# Patient Record
Sex: MALE
[De-identification: names, ages, dates, MRNs, and addresses within clinical notes are randomized per-mention and may not be internally consistent; named-entity substitution may affect disease eponyms.]

---

## 2017-11-25 ENCOUNTER — CHARTING TRANS (OUTPATIENT)
Dept: OTHER | Age: 67
End: 2017-11-25

## 2021-02-22 ENCOUNTER — LAB ENCOUNTER (OUTPATIENT)
Dept: LAB | Age: 71
End: 2021-02-22
Attending: OPHTHALMOLOGY
Payer: MEDICARE

## 2021-02-22 DIAGNOSIS — Z01.818 PRE-PROCEDURAL EXAMINATION: ICD-10-CM

## 2021-02-23 LAB — SARS-COV-2 RNA RESP QL NAA+PROBE: NOT DETECTED

## 2021-04-19 ENCOUNTER — HOSPITAL ENCOUNTER (OUTPATIENT)
Age: 71
Discharge: ANOTHER HOSPITAL | DRG: 190 | End: 2021-04-19
Attending: EMERGENCY MEDICINE
Payer: MEDICARE

## 2021-04-19 ENCOUNTER — HOSPITAL ENCOUNTER (INPATIENT)
Facility: HOSPITAL | Age: 71
LOS: 4 days | Discharge: HOME | DRG: 190 | End: 2021-04-23
Attending: EMERGENCY MEDICINE | Admitting: HOSPITALIST
Payer: MEDICARE

## 2021-04-19 ENCOUNTER — APPOINTMENT (OUTPATIENT)
Dept: CT IMAGING | Facility: HOSPITAL | Age: 71
DRG: 190 | End: 2021-04-19
Attending: EMERGENCY MEDICINE
Payer: MEDICARE

## 2021-04-19 ENCOUNTER — APPOINTMENT (OUTPATIENT)
Dept: GENERAL RADIOLOGY | Facility: HOSPITAL | Age: 71
DRG: 190 | End: 2021-04-19
Attending: EMERGENCY MEDICINE
Payer: MEDICARE

## 2021-04-19 VITALS
SYSTOLIC BLOOD PRESSURE: 128 MMHG | OXYGEN SATURATION: 90 % | RESPIRATION RATE: 22 BRPM | HEART RATE: 88 BPM | DIASTOLIC BLOOD PRESSURE: 90 MMHG | TEMPERATURE: 99 F | WEIGHT: 207 LBS | HEIGHT: 70 IN | BODY MASS INDEX: 29.63 KG/M2

## 2021-04-19 DIAGNOSIS — J44.9 CHRONIC OBSTRUCTIVE PULMONARY DISEASE, UNSPECIFIED COPD TYPE (HCC): ICD-10-CM

## 2021-04-19 DIAGNOSIS — R06.02 SHORTNESS OF BREATH: Primary | ICD-10-CM

## 2021-04-19 DIAGNOSIS — J44.1 COPD EXACERBATION (HCC): ICD-10-CM

## 2021-04-19 DIAGNOSIS — R09.02 HYPOXIA: ICD-10-CM

## 2021-04-19 DIAGNOSIS — R09.02 HYPOXIA: Primary | ICD-10-CM

## 2021-04-19 PROBLEM — E87.2 METABOLIC ACIDOSIS: Status: ACTIVE | Noted: 2021-04-19

## 2021-04-19 PROBLEM — E87.1 HYPONATREMIA: Status: ACTIVE | Noted: 2021-04-19

## 2021-04-19 PROCEDURE — 71045 X-RAY EXAM CHEST 1 VIEW: CPT | Performed by: EMERGENCY MEDICINE

## 2021-04-19 PROCEDURE — 36415 COLL VENOUS BLD VENIPUNCTURE: CPT

## 2021-04-19 PROCEDURE — 80047 BASIC METABLC PNL IONIZED CA: CPT

## 2021-04-19 PROCEDURE — 99204 OFFICE O/P NEW MOD 45 MIN: CPT

## 2021-04-19 PROCEDURE — 99223 1ST HOSP IP/OBS HIGH 75: CPT | Performed by: HOSPITALIST

## 2021-04-19 PROCEDURE — 93010 ELECTROCARDIOGRAM REPORT: CPT

## 2021-04-19 PROCEDURE — 85025 COMPLETE CBC W/AUTO DIFF WBC: CPT | Performed by: EMERGENCY MEDICINE

## 2021-04-19 PROCEDURE — 93005 ELECTROCARDIOGRAM TRACING: CPT

## 2021-04-19 PROCEDURE — 84484 ASSAY OF TROPONIN QUANT: CPT

## 2021-04-19 PROCEDURE — 71275 CT ANGIOGRAPHY CHEST: CPT | Performed by: EMERGENCY MEDICINE

## 2021-04-19 RX ORDER — METHYLPREDNISOLONE SODIUM SUCCINATE 125 MG/2ML
125 INJECTION, POWDER, LYOPHILIZED, FOR SOLUTION INTRAMUSCULAR; INTRAVENOUS ONCE
Status: COMPLETED | OUTPATIENT
Start: 2021-04-19 | End: 2021-04-19

## 2021-04-19 RX ORDER — ALBUTEROL SULFATE 2.5 MG/3ML
2.5 SOLUTION RESPIRATORY (INHALATION) EVERY 6 HOURS
Status: DISCONTINUED | OUTPATIENT
Start: 2021-04-20 | End: 2021-04-19

## 2021-04-19 RX ORDER — MELATONIN
2000 2 TIMES DAILY
COMMUNITY

## 2021-04-19 RX ORDER — FLUTICASONE PROPIONATE 50 MCG
2 SPRAY, SUSPENSION (ML) NASAL DAILY
COMMUNITY

## 2021-04-19 RX ORDER — ONDANSETRON 2 MG/ML
4 INJECTION INTRAMUSCULAR; INTRAVENOUS EVERY 6 HOURS PRN
Status: DISCONTINUED | OUTPATIENT
Start: 2021-04-19 | End: 2021-04-23

## 2021-04-19 RX ORDER — MONTELUKAST SODIUM 10 MG/1
10 TABLET ORAL NIGHTLY
Status: DISCONTINUED | OUTPATIENT
Start: 2021-04-19 | End: 2021-04-23

## 2021-04-19 RX ORDER — ENOXAPARIN SODIUM 100 MG/ML
40 INJECTION SUBCUTANEOUS NIGHTLY
Status: DISCONTINUED | OUTPATIENT
Start: 2021-04-19 | End: 2021-04-23

## 2021-04-19 RX ORDER — CALCIUM ACETATE 667 MG/1
667 CAPSULE ORAL DAILY
Status: DISCONTINUED | OUTPATIENT
Start: 2021-04-20 | End: 2021-04-23

## 2021-04-19 RX ORDER — ALBUTEROL SULFATE 2.5 MG/3ML
2.5 SOLUTION RESPIRATORY (INHALATION) EVERY 6 HOURS PRN
COMMUNITY

## 2021-04-19 RX ORDER — ALBUTEROL SULFATE 2.5 MG/3ML
2.5 SOLUTION RESPIRATORY (INHALATION) EVERY 6 HOURS
Status: DISCONTINUED | OUTPATIENT
Start: 2021-04-20 | End: 2021-04-20

## 2021-04-19 RX ORDER — AMLODIPINE BESYLATE 5 MG/1
5 TABLET ORAL DAILY
Status: DISCONTINUED | OUTPATIENT
Start: 2021-04-20 | End: 2021-04-23

## 2021-04-19 RX ORDER — ACETAMINOPHEN 325 MG/1
650 TABLET ORAL EVERY 6 HOURS PRN
Status: DISCONTINUED | OUTPATIENT
Start: 2021-04-19 | End: 2021-04-23

## 2021-04-19 RX ORDER — METHYLPREDNISOLONE SODIUM SUCCINATE 125 MG/2ML
60 INJECTION, POWDER, LYOPHILIZED, FOR SOLUTION INTRAMUSCULAR; INTRAVENOUS EVERY 6 HOURS
Status: DISCONTINUED | OUTPATIENT
Start: 2021-04-20 | End: 2021-04-21

## 2021-04-19 RX ORDER — FLUTICASONE PROPIONATE 50 MCG
2 SPRAY, SUSPENSION (ML) NASAL DAILY
Status: DISCONTINUED | OUTPATIENT
Start: 2021-04-20 | End: 2021-04-23

## 2021-04-19 RX ORDER — ONDANSETRON 2 MG/ML
4 INJECTION INTRAMUSCULAR; INTRAVENOUS EVERY 4 HOURS PRN
Status: DISCONTINUED | OUTPATIENT
Start: 2021-04-19 | End: 2021-04-19

## 2021-04-19 RX ORDER — ALBUTEROL SULFATE 90 UG/1
2 AEROSOL, METERED RESPIRATORY (INHALATION) 4 TIMES DAILY
Status: DISCONTINUED | OUTPATIENT
Start: 2021-04-19 | End: 2021-04-19

## 2021-04-19 RX ORDER — MONTELUKAST SODIUM 10 MG/1
10 TABLET ORAL NIGHTLY
COMMUNITY

## 2021-04-19 NOTE — ED INITIAL ASSESSMENT (HPI)
Pt presents to the IC with complaints of feeling worsening shortness of breath over the past month. Pt last saw his doctor in March. Pt reports oxygen saturation has been running in the 70's to 80's at home, worse over past few days.     Pt awake and alert,

## 2021-04-19 NOTE — ED QUICK NOTES
[facility] ambulance here. Pt noted to have sat down to 88% on 4l with exerting himself trying to get wallet out of pocket. Oxygen increased to 6l/nc and sat 94%, RR 26.

## 2021-04-19 NOTE — ED PROVIDER NOTES
Patient Seen in: [facility]      History   Patient presents with:  Difficulty Breathing    Stated Complaint: sob x 1 day     HPI/Subjective:   HPI    Patient is a 66-year-old male with a history of hypertension COPD who presents to the im Exam     ED Triage Vitals   BP 04/19/21 1712 119/77   Pulse 04/19/21 1658 100   Resp 04/19/21 1712 24   Temp 04/19/21 1658 100.2 °F (37.9 °C)   Temp src 04/19/21 1658 Temporal   SpO2 04/19/21 1712 91 %   O2 Device 04/19/21 1710 Nasal cannula       Current: available for comparison. MDM      Patient is a pleasant 41-year-old male with a history of hypertension and COPD, not chronically on oxygen who presents with shortness of breath.   He and his wife states it has been about a month that he has

## 2021-04-19 NOTE — ED QUICK NOTES
Pt to go to ER. Per MD, ok to send pt by private ambulance. Pt reports he is feeling better; pt states he does not feel like his breathing is labored at this time; sat 90% on 4L/nc.

## 2021-04-19 NOTE — ED QUICK NOTES
MD at bedside. Pt awake and alert, skin w/d,resps reg/labored. Pt with sat 90% on 6l/nc. Pt states his oxygen level is normally in the mid 80's but sat has been in the 70's over the last couple of days.  Pt reports he does have COPD but has never bee

## 2021-04-19 NOTE — ED QUICK NOTES
Pt on stretcher, ready for transport to [facility]; pt with sat 94% on 6l/nc, will decrease to 4l/nc enroute if sat remains at 90% or higher. Pt does not appear in distress at this time, awake and alert, skin w/d.  Wife at bedside and will meet at [facility]

## 2021-04-19 NOTE — ED INITIAL ASSESSMENT (HPI)
Pt here from [address] [place] [address] for SOB. Pt was reading 68% on RA at .  Pt was placed on 6L NC

## 2021-04-19 NOTE — ED PROVIDER NOTES
Patient Seen in: [facility] Emergency Department      History   Patient presents with:  Difficulty Breathing    Stated Complaint: SOB    HPI/Subjective:   HPI    Patient is a pleasant 70-year-old male, presenting for evaluation of dyspnea and low ox Exam     ED Triage Vitals [04/19/21 1822]   BP (!) 169/92   Pulse 84   Resp 20   Temp 99.1 °F (37.3 °C)   Temp src Temporal   SpO2 94 %   O2 Device Nasal cannula       Current:/84   Pulse 84   Temp 99.1 °F (37.3 °C) (Temporal)   Resp 24   Ht 180.3 cm Resulted by: [id number]: K,    CBC WITH DIFFERENTIAL WITH PLATELET    Narrative: The following orders were created for panel order CBC WITH DIFFERENTIAL WITH PLATELET.   Procedure                               Abnormality         Status lymph node. Multiple smaller scattered mediastinal lymph nodes have short axis measurements of less than 10 mm. SCOTTY:  1.6 x 1.6 cm right hilar lymph node and 1.6 x 1.4 cm left hilar lymph node. CARDIAC:  Extensive coronary artery calcium.  PLEURA:  No mas were obtained. Results of the patient's laboratory and radiology studies were reviewed and discussed with the patient. Solu-Medrol and albuterol MDI was ordered. Patient will be admitted to the service of the [facility].   Dr. Saravanan Garcia notifi

## 2021-04-19 NOTE — ED QUICK NOTES
Pt states he is feeling much better. resps appear only slightly labored at this time. Pt did have dyspnea with exertion of changing into gown with sat down to 85%, recovered with rest up to 90% on 4l/nc.

## 2021-04-20 ENCOUNTER — APPOINTMENT (OUTPATIENT)
Dept: CV DIAGNOSTICS | Facility: HOSPITAL | Age: 71
DRG: 190 | End: 2021-04-20
Attending: INTERNAL MEDICINE
Payer: MEDICARE

## 2021-04-20 PROBLEM — J44.1 COPD EXACERBATION (HCC): Status: ACTIVE | Noted: 2021-04-19

## 2021-04-20 PROCEDURE — 99232 SBSQ HOSP IP/OBS MODERATE 35: CPT | Performed by: HOSPITALIST

## 2021-04-20 PROCEDURE — 93306 TTE W/DOPPLER COMPLETE: CPT | Performed by: INTERNAL MEDICINE

## 2021-04-20 RX ORDER — IPRATROPIUM BROMIDE AND ALBUTEROL SULFATE 2.5; .5 MG/3ML; MG/3ML
3 SOLUTION RESPIRATORY (INHALATION)
Status: DISCONTINUED | OUTPATIENT
Start: 2021-04-20 | End: 2021-04-22

## 2021-04-20 NOTE — PLAN OF CARE
Pt received at 0730 resting in bed. A&Ox4. On 5L O2 nasal cannula, saturating 92%, will wean as able. Does desaturate to low 80's with activity. Dyspnea on exertion. Nonproductive cough. Lungs with coarse crackles.  IV steroids and nebulizer treatments as o

## 2021-04-20 NOTE — H&P
[facility]  History and Physical     Gera August Patient Status:  Inpatient    1950 MRN [id number]   [facility] 2NE-A Attending Daniel Fine, [address] [facility] Day # 0 PCP Aman Reyes DO     Chief Complaint: SOB    History o mouth nightly., Disp: , Rfl:   guaiFENesin (MUCINEX OR), Take 1,200 mg by mouth 2 (two) times a day., Disp: , Rfl:   AmLODIPine Besylate (NORVASC) 5 MG Oral Tab, Take 5 mg by mouth daily. , Disp: , Rfl:   Multiple Vitamins-Minerals (MULTI-VITAMIN/MINERALS) CA  --  8.8   ALB  --  3.3*   NA  --  133*   K  --  4.7   CL  --  107   CO2  --  18.0*   ALKPHO  --  65   AST  --  32   ALT  --  41   BILT  --  0.5   TP  --  7.7       Estimated Creatinine Clearance: 104.6 mL/min (A) (based on SCr of 0.69 mg/dL (L)).

## 2021-04-20 NOTE — PLAN OF CARE
Received pt at 2230. A&Ox4, NSR on tele, lung sounds with coarse crackles, on 6L NC. Sats 88-91% with rest but will drop to low 80s with any exertion. Up with SBA.  Has had a chronic cough for years but recently has had yellow sputum production along with c

## 2021-04-20 NOTE — CM/SW NOTE
04/20/21 1300   CM/SW Referral Data   Referral Source Physician   Reason for Referral Discharge planning   Informant Patient   Patient Info   Patient's Mental Status Alert;Oriented   Patient's [address]   Number of Levels in Home   [facility])

## 2021-04-20 NOTE — CONSULTS
Pio Frye [address] [facility] [address] Note [facility]  Report of Consultation    Samira Jamil Patient Status:  Inpatient    1950 MRN [id number]   [facility] 2NE-A Attending Maritza Brooks used smokeless tobacco. He reports previous alcohol use. He reports that he does not use drugs.     Allergies:  No Known Allergies    Medications:  • azithromycin  500 mg Intravenous Q24H   • amLODIPine Besylate  5 mg Oral Daily   • calcium acetate  667 mg 0321 124/66 97.7 °F (36.5 °C) Oral 95 18 95 % — —   04/20/21 0116 145/67 98 °F (36.7 °C) Oral 105 22 (!) 86 % — —   04/19/21 2215 139/71 — — 97 — (!) 83 % — —   04/19/21 2200 149/77 98.7 °F (37.1 °C) Oral 89 — 91 % — 203 lb (92.1 kg)   04/19/21 1930 140/84 Recent Labs   Lab 04/19/21  1836   INR 1.03   PTT 29.8       Other Labs:   COVID-19 Lab Results    COVID-19  Lab Results   Component Value Date    COVID19 Not Detected 04/19/2021    COVID19 Not Detected 02/22/2021       Pro-Calcitonin  Lab Results   Co failure due to COPD exacerbation, need to evaluate for concomitant pulmonary HTN  · COPD exacerbation with 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

## 2021-04-20 NOTE — PROGRESS NOTES
04/20/21 1655   Mobility   O2 walk?  Yes   SPO2% on Room Air at Rest 78   SPO2% on Oxygen at Rest 93   At rest oxygen flow (liters per minute) 5   SPO2% Ambulation on Room Air 78   SPO2% Ambulation on Oxygen 90   Ambulation oxygen flow (liters per minute

## 2021-04-20 NOTE — PROGRESS NOTES
[facility]  Progress note     Carmina Davalos Patient Status:  Inpatient    1950 MRN [id number]   [facility] 2NE-A Attending Jennifer Arroyo, [address] [facility] Day # 1 PCP Franki Mario DO     Chief Complaint: SOB    Subjective: osorio last 168 hours. Inflammatory Markers  No results for input(s): CRP, LAURA, LDH, DDIMER in the last 168 hours. Imaging: Imaging data reviewed in Epic. ASSESSMENT / PLAN:     1. Acute hypoxic respiratory failure secondary to COPD exacerbation  1.  St

## 2021-04-20 NOTE — CM/SW NOTE
04/20/21 1300   CM/SW Referral Data   Referral Source Physician   Reason for Referral Discharge planning   Informant Spouse   Patient [address]   Number of Levels in Home   (Multiple)   Number of Stair in Home 4 sets of 5 s

## 2021-04-21 PROCEDURE — 99232 SBSQ HOSP IP/OBS MODERATE 35: CPT | Performed by: INTERNAL MEDICINE

## 2021-04-21 RX ORDER — METHYLPREDNISOLONE SODIUM SUCCINATE 125 MG/2ML
60 INJECTION, POWDER, LYOPHILIZED, FOR SOLUTION INTRAMUSCULAR; INTRAVENOUS EVERY 12 HOURS
Status: DISCONTINUED | OUTPATIENT
Start: 2021-04-21 | End: 2021-04-22

## 2021-04-21 RX ORDER — FUROSEMIDE 10 MG/ML
20 INJECTION INTRAMUSCULAR; INTRAVENOUS ONCE
Status: COMPLETED | OUTPATIENT
Start: 2021-04-21 | End: 2021-04-21

## 2021-04-21 RX ORDER — FUROSEMIDE 10 MG/ML
20 INJECTION INTRAMUSCULAR; INTRAVENOUS ONCE
Status: COMPLETED | OUTPATIENT
Start: 2021-04-22 | End: 2021-04-22

## 2021-04-21 NOTE — PLAN OF CARE
Assumed care of patient 1930 up in chair. AO x4. NSR on tele monitor, ST with ambulation. O2 sat adequate on 5L at rest, requires 8L with ambulation/activity. Denies chest pain and SOB, although does endorse PADILLA. Plan of care updated with patient.  Bed lock

## 2021-04-21 NOTE — PLAN OF CARE
Pt received at 0730 resting in bed. A&Ox4. On 4L O2 nasal cannula, will wean as able. Requires 8L while ambulating. Nonproductive cough. Using flutter valve as ordered. IV solumedrol and nebulizer treatments per order.  Sinus rhythm/sinus tach on the [facility]

## 2021-04-21 NOTE — PROGRESS NOTES
Formerly named [facility]    [facility] [place] [address]    Phone:  [phone]    Fax:  [phone]       Thank You for choosing us for your health care visit. We are glad to serve you and happy to provide you with this summary of your visit. Please help us to ensure we have accurate records. If you find anything that needs to be changed, please let our staff know as soon as possible.          Your Demographic Information     Patient Name Sex     Chris Contreras Male 1968       Ethnic Group Patient Race    Not of  or  Origin White      Your Visit Details     Date & Time Provider Department    3/21/2017 11:30 AM Alessandra Mcguire PA-C Formerly named [facility]      Your Upcoming Appointment*(Max 10)       1:00 PM CDT   Follow-up Visit with Alessandra Mcguire PA-C   [facility] ([facility])    [address] [place] [address] [address] [place] [address]   [phone]              Your To Do List     Follow-Up    Return in about 10 days (around 3/31/2017) for Left wrist, cast off XR (2vw C-arm), cast change.      Your Vitals Were     Smoking Status                   Former Smoker           Medications Prescribed or Re-Ordered Today     None      Your Current Medications Are        Disp Refills Start End    ibuprofen (MOTRIN) 800 MG tablet 42 tablet 1 3/14/2017     Sig - Route: Take 1 tablet by mouth 3 times daily as needed for Pain. - Oral    Class: Eprescribe    Notes to Pharmacy: Please apologize to pt. Was sent to wrong pharmacy. An    HYDROcodone-acetaminophen (NORCO) 5-325 MG per tablet 30 tablet 0 3/9/2017     Sig - Route: Take 2 tablets by mouth every 6 hours as needed for Pain. - Oral    aspirin 81 MG tablet        Sig - Route: Take 81 mg by mouth daily. - Oral    Class: Historical Med    tadalafil (CIALIS) 5 MG tablet 30 tablet 11 2013     Sig - Route:  04/21/21 1110   Mobility   O2 walk?  Yes   SPO2% on Room Air at Rest 84   SPO2% on Oxygen at Rest 96   At rest oxygen flow (liters per minute) 4   SPO2% Ambulation on Room Air 70   SPO2% Ambulation on Oxygen 89   Ambulation oxygen flow (liters per minute Take 1 tablet by mouth daily. - Oral    Class: Eprescribe      Allergies     No Known Allergies      Immunizations History as of 3/21/2017     Name Date    Tdap 6/2/2012      Problem List as of 3/21/2017     Helicobacter pylori (H. pylori)    Gallbladder polyp    Near syncope    Fatigue    Closed fracture of distal ends of left radius and ulna            Patient Instructions     None

## 2021-04-21 NOTE — HOME CARE LIAISON
Patient provided with list of [facility] providers from [address], patient choice is [facility]. Agency reserved in [address] and contact information placed on AVS.   Financial interest disclosure provided to patient. SW updated.

## 2021-04-21 NOTE — PROGRESS NOTES
[facility] Pulmonary/Critical Care Progress Note     SUBJECTIVE/Interval history: All events, procedures, notes reviewed. No acute events overnight, he feels better today, improving dyspnea and cough. No pain.  No fevers --  133*   K  --  4.7   CL  --  107   CO2  --  18.0*     Recent Labs   Lab 04/19/21  1722 04/19/21  1836   RBC  --  4.35   HGB  --  13.7   HCT  --  41.9   MCV 96.5 96.3   MCH  --  31.5   MCHC 31.8 32.7   RDW  --  15.3*   NEPRELIM  --  8.62*   WBC  --  10.7 Nightly   • umeclidinium-vilanterol  1 puff Inhalation Daily   • enoxaparin  40 mg Subcutaneous Nightly   • MethylPREDNISolone Sodium Succ  60 mg Intravenous Q6H     acetaminophen, ondansetron HCl    Chest imaging reviewed personally which reveals bilatera

## 2021-04-21 NOTE — PROGRESS NOTES
[facility]  Progress note     Gennaro Cooks Patient Status:  Inpatient    1950 MRN [id number]   [facility] 2NE-A Attending Brooks Coffey, [address] Day [address] PCP Jv Quinn DO     Chief Complaint: SOB    Subjective: No a PBNP 2,588*     Creatinine Kinase  No results for input(s): CK in the last 168 hours. Inflammatory Markers  No results for input(s): CRP, LAURA, LDH, DDIMER in the last 168 hours. Imaging: Imaging data reviewed in Epic. ASSESSMENT / PLAN:     1.  A

## 2021-04-22 PROCEDURE — 99232 SBSQ HOSP IP/OBS MODERATE 35: CPT | Performed by: INTERNAL MEDICINE

## 2021-04-22 RX ORDER — PREDNISONE 20 MG/1
20 TABLET ORAL ONCE
Status: COMPLETED | OUTPATIENT
Start: 2021-04-22 | End: 2021-04-22

## 2021-04-22 RX ORDER — PREDNISONE 20 MG/1
40 TABLET ORAL
Status: DISCONTINUED | OUTPATIENT
Start: 2021-04-23 | End: 2021-04-23

## 2021-04-22 RX ORDER — IPRATROPIUM BROMIDE AND ALBUTEROL SULFATE 2.5; .5 MG/3ML; MG/3ML
3 SOLUTION RESPIRATORY (INHALATION)
Status: DISCONTINUED | OUTPATIENT
Start: 2021-04-22 | End: 2021-04-23

## 2021-04-22 NOTE — BH PROGRESS NOTE
spo2 room air at rest=86%  spo2 on 3 liters at rest=93%  spo2  On ambulation on room air=81-77%  spo2 on 6 liters at rest=90%

## 2021-04-22 NOTE — PROGRESS NOTES
[facility] Pulmonary/Critical Care Progress Note     SUBJECTIVE/Interval history: All events, procedures, notes reviewed. No acute events overnight, he feels better today, improving dyspnea and cough. No pain.  No fevers GFRNAA 90 96   CA  --  8.8   NA  --  133*   K  --  4.7   CL  --  107   CO2  --  18.0*     Recent Labs   Lab 04/19/21  1722 04/19/21  1836   RBC  --  4.35   HGB  --  13.7   HCT  --  41.9   MCV 96.5 96.3   MCH  --  31.5   MCHC 31.8 32.7   RDW  --  15.3* • Montelukast Sodium  10 mg Oral Nightly   • umeclidinium-vilanterol  1 puff Inhalation Daily   • enoxaparin  40 mg Subcutaneous Nightly     acetaminophen, ondansetron HCl    Chest imaging reviewed personally which reveals bilateral emphysematous changes

## 2021-04-22 NOTE — PLAN OF CARE
Assumed care of patient 1930, up in chair. AO x4. NSR on tele monitor. O2 sats in mid 90s at rest on 3L nasal cannula. Patient does desat to 70-80s with any movement, and requires 5-6L with ambulation. Denies chest pain, endorses PADILLA.  Plan of care updated

## 2021-04-22 NOTE — PLAN OF CARE
Unable to wean 02( pls see o2 walk notes)  Deny dyspnea at rest  stewart is  the same per patient    No distress  dounebs per RT inhalers  and flutter valve  Sinus rhythm on tele  Deny pain  Problem: RESPIRATORY - ADULT  Goal: Achieves optimal ventilation a

## 2021-04-22 NOTE — PROGRESS NOTES
[facility]  Progress note     Camryn Jerrod Patient Status:  Inpatient    1950 MRN [id number]   [facility] 2NE-A Attending Janis Henriquez DO   Hosp Day # 3 PCP Corwin Conklin DO     Chief Complaint: SOB    Subjective: No a Lab 04/19/21  1836   PCT <0.05     Cardiac  Recent Labs   Lab 04/19/21  1836   TROP <0.045   PBNP 2,588*     Creatinine Kinase  No results for input(s): CK in the last 168 hours.     Inflammatory Markers  No results for input(s): CRP, LAURA, LDH, DDIMER in

## 2021-04-22 NOTE — RESPIRATORY THERAPY NOTE
Patient received on 3L NC, breathing comfortable, sats and other vitals wnl. Breath sounds clear but diminished with no wheezes or ronchi. Will give duonebs QID and PRN.

## 2021-04-23 VITALS
TEMPERATURE: 98 F | RESPIRATION RATE: 18 BRPM | BODY MASS INDEX: 28.37 KG/M2 | OXYGEN SATURATION: 95 % | HEART RATE: 90 BPM | SYSTOLIC BLOOD PRESSURE: 121 MMHG | WEIGHT: 202.63 LBS | HEIGHT: 71 IN | DIASTOLIC BLOOD PRESSURE: 69 MMHG

## 2021-04-23 PROCEDURE — 99239 HOSP IP/OBS DSCHRG MGMT >30: CPT | Performed by: INTERNAL MEDICINE

## 2021-04-23 RX ORDER — PREDNISONE 10 MG/1
TABLET ORAL
Qty: 26 TABLET | Refills: 0 | Status: SHIPPED | OUTPATIENT
Start: 2021-04-23 | End: 2021-05-04

## 2021-04-23 RX ORDER — KETOROLAC TROMETHAMINE 5 MG/ML
SOLUTION OPHTHALMIC
COMMUNITY

## 2021-04-23 NOTE — PLAN OF CARE
Received patient at 0730. Alert and orientated x4. Tele Rhythm NSR. O2 saturation 91% on 3L at rest. Breath sounds diminished. Shortness of breath on exertion. No C/O chest pain or dizziness. Pt voiding with no issues. Pt ambulated in room w/o issues.  Skin

## 2021-04-23 NOTE — PROGRESS NOTES
[facility]  Progress note     Romana Ji Patient Status:  Inpatient    1950 MRN [id number]   [facility] 2NE-A Attending Marge Abrams, [address] [facility] Day # 4 PCP Alexia Valdez DO     Chief Complaint: SOB    Subjective: No a <0.045   PBNP 2,588*     Creatinine Kinase  No results for input(s): CK in the last 168 hours. Inflammatory Markers  No results for input(s): CRP, LAURA, LDH, DDIMER in the last 168 hours. Imaging: Imaging data reviewed in Epic.     ASSESSMENT / PLAN:

## 2021-04-23 NOTE — DISCHARGE SUMMARY
[facility] HOSPITALIST  DISCHARGE SUMMARY     Romana Ji Patient Status:  Inpatient    1950 MRN [id number]   [facility] 2NE-A Attending Santiago Patel DO   Hosp Day # 4 PCP Alexia Valdez DO     Date of Admission: 2021  Date of 3 days, THEN 2 tablets (20 mg total) daily for 3 days, THEN 1 tablet (10 mg total) daily for 3 days. Stop taking on:  May 4, 2021  Quantity: 26 tablet  Refills: 0        CONTINUE taking these medications      Instructions Prescription details   albuterol prescribed at discharge.     Follow-up appointment:   Venancio Castle MD  [address] [facility]  [address] [phone]    In 2 weeks  hospital follow up    Anya Cooper [id number] [phone]    In

## 2021-04-23 NOTE — PLAN OF CARE
NURSING DISCHARGE NOTE    Discharged Home via Wheelchair. Accompanied by Spouse  Belongings taken by patient/family. Patient denies pain, dizziness, and SOB. Cleared by all consults.  Discharged to [place] with all belongings and accompanied

## 2021-04-23 NOTE — PLAN OF CARE
Assumed care at [address]. Pt A&Ox4. Pt baseline is RA, pt uses 3L at rest currently and 6L w/ ambulation. SW to see tomorrow for home O2 needs. NSR on tele. Voids, continent. Denies pain at this time. Ambulates w/ SBA.  All needs addressed at this time, VSS, co

## 2021-04-23 NOTE — CM/SW NOTE
O2 has been approved through [employer]. AVS updated with the contact information. Piedad Cartagena from [employer] is at the hospital now delivering the portable tank to the pt.      HERNANDO Vega, [facility]   / Discharge Planner  [id number]

## 2021-04-23 NOTE — PROGRESS NOTES
[facility]  Progress Note    Gini Hugo Patient Status:  Inpatient    1950 MRN [id number]   [facility] 2NE-A Attending Darian Muñoz DO   Hosp Day # 4 PCP Angelina Solis DO     Subjective:  Gini Hugo is a(n) 70 year o 04/19/21  1836   PCT <0.05       Cardiac  Recent Labs   Lab 04/19/21  1836   TROP <0.045   PBNP 2,588*       Creatinine Kinase  No results for input(s): CK in the last 168 hours.     Inflammatory Markers  No results for input(s): CRP, LAURA, LDH, DDIMER in th

## 2021-05-13 ENCOUNTER — ORDER TRANSCRIPTION (OUTPATIENT)
Dept: ADMINISTRATIVE | Facility: HOSPITAL | Age: 71
End: 2021-05-13

## 2021-05-13 DIAGNOSIS — Z11.59 ENCOUNTER FOR SCREENING FOR OTHER VIRAL DISEASES: ICD-10-CM

## 2021-05-13 DIAGNOSIS — Z01.818 PREOP EXAMINATION: Primary | ICD-10-CM

## 2021-05-13 DIAGNOSIS — R09.02 HYPOXIA: ICD-10-CM

## 2021-05-13 DIAGNOSIS — J44.9 CHRONIC OBSTRUCTIVE PULMONARY DISEASE (HCC): Primary | ICD-10-CM

## 2021-05-13 DIAGNOSIS — I27.20 PULMONARY HYPERTENSION (HCC): ICD-10-CM

## 2021-05-15 ENCOUNTER — LAB ENCOUNTER (OUTPATIENT)
Dept: LAB | Age: 71
End: 2021-05-15
Attending: OPHTHALMOLOGY
Payer: MEDICARE

## 2021-05-15 DIAGNOSIS — Z01.818 PRE-PROCEDURAL EXAMINATION: ICD-10-CM

## 2021-05-30 ENCOUNTER — LAB ENCOUNTER (OUTPATIENT)
Dept: LAB | Facility: HOSPITAL | Age: 71
End: 2021-05-30
Attending: INTERNAL MEDICINE
Payer: MEDICARE

## 2021-05-30 DIAGNOSIS — Z01.818 PREOP EXAMINATION: ICD-10-CM

## 2021-05-30 DIAGNOSIS — Z11.59 ENCOUNTER FOR SCREENING FOR OTHER VIRAL DISEASES: ICD-10-CM

## 2021-06-02 ENCOUNTER — RT VISIT (OUTPATIENT)
Dept: RESPIRATORY THERAPY | Facility: HOSPITAL | Age: 71
End: 2021-06-02
Attending: INTERNAL MEDICINE
Payer: MEDICARE

## 2021-06-02 DIAGNOSIS — I27.20 PULMONARY HYPERTENSION (HCC): ICD-10-CM

## 2021-06-02 DIAGNOSIS — R09.02 HYPOXIA: ICD-10-CM

## 2021-06-02 DIAGNOSIS — J44.9 CHRONIC OBSTRUCTIVE PULMONARY DISEASE (HCC): ICD-10-CM

## 2021-06-02 PROCEDURE — 94729 DIFFUSING CAPACITY: CPT

## 2021-06-02 PROCEDURE — 94060 EVALUATION OF WHEEZING: CPT

## 2021-06-02 PROCEDURE — 94726 PLETHYSMOGRAPHY LUNG VOLUMES: CPT

## 2021-06-02 NOTE — PROCEDURES
Findings:  Postbronchodilator FEV1 is 1.82L, 56% predicted. Postbronchodilator FVC is 3.07L, 71% predicted. FEV1/ FVC ratio is 0.59. There is no significant bronchodilator response after   administration of albuterol.    The flow-volume loop demonstrates

## 2021-10-19 ENCOUNTER — HOSPITAL ENCOUNTER (OUTPATIENT)
Dept: CT IMAGING | Facility: HOSPITAL | Age: 71
Discharge: HOME | End: 2021-10-19
Attending: INTERNAL MEDICINE
Payer: MEDICARE

## 2021-10-19 DIAGNOSIS — I27.20 PULMONARY HYPERTENSION (HCC): ICD-10-CM

## 2021-10-19 DIAGNOSIS — J44.9 CHRONIC OBSTRUCTIVE PULMONARY DISEASE, UNSPECIFIED COPD TYPE (HCC): ICD-10-CM

## 2021-10-19 DIAGNOSIS — R09.02 HYPOXIA: ICD-10-CM

## 2021-10-19 PROCEDURE — 82565 ASSAY OF CREATININE: CPT

## 2021-10-19 PROCEDURE — 71260 CT THORAX DX C+: CPT | Performed by: INTERNAL MEDICINE

## 2022-02-12 ENCOUNTER — APPOINTMENT (OUTPATIENT)
Dept: GENERAL RADIOLOGY | Facility: HOSPITAL | Age: 72
DRG: 190 | End: 2022-02-12
Attending: STUDENT IN AN ORGANIZED HEALTH CARE EDUCATION/TRAINING PROGRAM
Payer: MEDICARE

## 2022-02-12 ENCOUNTER — APPOINTMENT (OUTPATIENT)
Dept: CT IMAGING | Facility: HOSPITAL | Age: 72
DRG: 190 | End: 2022-02-12
Attending: STUDENT IN AN ORGANIZED HEALTH CARE EDUCATION/TRAINING PROGRAM
Payer: MEDICARE

## 2022-02-12 ENCOUNTER — HOSPITAL ENCOUNTER (INPATIENT)
Facility: HOSPITAL | Age: 72
LOS: 10 days | Discharge: HOME | DRG: 190 | End: 2022-02-22
Attending: STUDENT IN AN ORGANIZED HEALTH CARE EDUCATION/TRAINING PROGRAM | Admitting: HOSPITALIST
Payer: MEDICARE

## 2022-02-12 DIAGNOSIS — J43.9 PULMONARY EMPHYSEMA, UNSPECIFIED EMPHYSEMA TYPE (HCC): ICD-10-CM

## 2022-02-12 DIAGNOSIS — J96.21 ACUTE ON CHRONIC RESPIRATORY FAILURE WITH HYPOXIA (HCC): Primary | ICD-10-CM

## 2022-02-12 DIAGNOSIS — R06.00 DYSPNEA, UNSPECIFIED TYPE: ICD-10-CM

## 2022-02-12 LAB
ADENOVIRUS PCR:: NOT DETECTED
ALBUMIN SERPL-MCNC: 3.2 G/DL (ref 3.4–5)
ALBUMIN/GLOB SERPL: 0.8 {RATIO} (ref 1–2)
ALT SERPL-CCNC: 42 U/L
ANION GAP SERPL CALC-SCNC: 9 MMOL/L (ref 0–18)
APTT PPP: 28.7 SECONDS (ref 23.3–35.6)
AST SERPL-CCNC: 37 U/L (ref 15–37)
B PARAPERT DNA SPEC QL NAA+PROBE: NOT DETECTED
B PERT DNA SPEC QL NAA+PROBE: NOT DETECTED
BASE EXCESS BLDV CALC-SCNC: 3.5 MMOL/L
BASOPHILS # BLD AUTO: 0.03 X10(3) UL (ref 0–0.2)
BASOPHILS NFR BLD AUTO: 0.3 %
BILIRUB SERPL-MCNC: 0.4 MG/DL (ref 0.1–2)
BUN BLD-MCNC: 20 MG/DL (ref 7–18)
C PNEUM DNA SPEC QL NAA+PROBE: NOT DETECTED
CALCIUM BLD-MCNC: 9.1 MG/DL (ref 8.5–10.1)
CHLORIDE SERPL-SCNC: 103 MMOL/L (ref 98–112)
CO2 SERPL-SCNC: 24 MMOL/L (ref 21–32)
CORONAVIRUS 229E PCR:: NOT DETECTED
CORONAVIRUS HKU1 PCR:: NOT DETECTED
CORONAVIRUS NL63 PCR:: NOT DETECTED
CORONAVIRUS OC43 PCR:: NOT DETECTED
CREAT BLD-MCNC: 1.12 MG/DL
D DIMER PPP FEU-MCNC: 0.44 UG/ML FEU (ref ?–0.72)
EOSINOPHIL # BLD AUTO: 0.19 X10(3) UL (ref 0–0.7)
EOSINOPHIL NFR BLD AUTO: 1.6 %
ERYTHROCYTE [DISTWIDTH] IN BLOOD BY AUTOMATED COUNT: 14.5 %
FLUAV RNA SPEC QL NAA+PROBE: NOT DETECTED
FLUBV RNA SPEC QL NAA+PROBE: NOT DETECTED
GLOBULIN PLAS-MCNC: 4.2 G/DL (ref 2.8–4.4)
GLUCOSE BLD-MCNC: 96 MG/DL (ref 70–99)
HCO3 BLDV-SCNC: 26.8 MEQ/L (ref 22–26)
HCT VFR BLD AUTO: 44.2 %
HGB BLD-MCNC: 14.5 G/DL
IMM GRANULOCYTES # BLD AUTO: 0.05 X10(3) UL (ref 0–1)
IMM GRANULOCYTES NFR BLD: 0.4 %
INR BLD: 1.02 (ref 0.8–1.2)
LACTATE SERPL-SCNC: 1.5 MMOL/L (ref 0.4–2)
LACTATE SERPL-SCNC: 3.3 MMOL/L (ref 0.4–2)
LYMPHOCYTES # BLD AUTO: 0.61 X10(3) UL (ref 1–4)
LYMPHOCYTES NFR BLD AUTO: 5.2 %
MCH RBC QN AUTO: 32.7 PG (ref 26–34)
MCHC RBC AUTO-ENTMCNC: 32.8 G/DL (ref 31–37)
MCV RBC AUTO: 99.8 FL
METAPNEUMOVIRUS PCR:: NOT DETECTED
MONOCYTES # BLD AUTO: 0.81 X10(3) UL (ref 0.1–1)
MONOCYTES NFR BLD AUTO: 6.9 %
MYCOPLASMA PNEUMONIA PCR:: NOT DETECTED
NEUTROPHILS # BLD AUTO: 10.03 X10 (3) UL (ref 1.5–7.7)
NEUTROPHILS # BLD AUTO: 10.03 X10(3) UL (ref 1.5–7.7)
NEUTROPHILS NFR BLD AUTO: 85.6 %
NT-PROBNP SERPL-MCNC: 3938 PG/ML (ref ?–125)
OSMOLALITY SERPL CALC.SUM OF ELEC: 284 MOSM/KG (ref 275–295)
OXYHGB MFR BLDV: 66.4 % (ref 72–78)
PARAINFLUENZA 1 PCR:: NOT DETECTED
PARAINFLUENZA 2 PCR:: NOT DETECTED
PARAINFLUENZA 3 PCR:: NOT DETECTED
PARAINFLUENZA 4 PCR:: NOT DETECTED
PCO2 BLDV: 40 MM HG (ref 38–50)
PH BLDV: 7.45 [PH] (ref 7.33–7.43)
PLATELET # BLD AUTO: 230 10(3)UL (ref 150–450)
PO2 BLDV: 39 MM HG (ref 30–50)
POTASSIUM SERPL-SCNC: 4.8 MMOL/L (ref 3.5–5.1)
PROCALCITONIN SERPL-MCNC: <0.05 NG/ML (ref ?–0.16)
PROT SERPL-MCNC: 7.4 G/DL (ref 6.4–8.2)
PROTHROMBIN TIME: 13.4 SECONDS (ref 11.6–14.8)
RBC # BLD AUTO: 4.43 X10(6)UL
RHINOVIRUS/ENTERO PCR:: NOT DETECTED
RSV RNA SPEC QL NAA+PROBE: NOT DETECTED
SARS-COV-2 RNA NPH QL NAA+NON-PROBE: NOT DETECTED
SARS-COV-2 RNA RESP QL NAA+PROBE: NOT DETECTED
SODIUM SERPL-SCNC: 136 MMOL/L (ref 136–145)
TROPONIN I HIGH SENSITIVITY: 29 NG/L
TROPONIN I HIGH SENSITIVITY: 38 NG/L
VENOUS LITERS PER MINUTE: 15 L/MIN
WBC # BLD AUTO: 11.7 X10(3) UL (ref 4–11)

## 2022-02-12 PROCEDURE — 99223 1ST HOSP IP/OBS HIGH 75: CPT | Performed by: HOSPITALIST

## 2022-02-12 PROCEDURE — 71260 CT THORAX DX C+: CPT | Performed by: STUDENT IN AN ORGANIZED HEALTH CARE EDUCATION/TRAINING PROGRAM

## 2022-02-12 PROCEDURE — 5A0945A ASSISTANCE WITH RESPIRATORY VENTILATION, 24-96 CONSECUTIVE HOURS, HIGH NASAL FLOW/VELOCITY: ICD-10-PCS | Performed by: INTERNAL MEDICINE

## 2022-02-12 PROCEDURE — 71045 X-RAY EXAM CHEST 1 VIEW: CPT | Performed by: STUDENT IN AN ORGANIZED HEALTH CARE EDUCATION/TRAINING PROGRAM

## 2022-02-12 RX ORDER — METHYLPREDNISOLONE SODIUM SUCCINATE 40 MG/ML
40 INJECTION, POWDER, LYOPHILIZED, FOR SOLUTION INTRAMUSCULAR; INTRAVENOUS EVERY 12 HOURS
Status: DISCONTINUED | OUTPATIENT
Start: 2022-02-12 | End: 2022-02-13

## 2022-02-12 RX ORDER — IPRATROPIUM BROMIDE AND ALBUTEROL SULFATE 2.5; .5 MG/3ML; MG/3ML
3 SOLUTION RESPIRATORY (INHALATION)
Status: DISCONTINUED | OUTPATIENT
Start: 2022-02-12 | End: 2022-02-13

## 2022-02-12 RX ORDER — BENZONATATE 100 MG/1
100 CAPSULE ORAL 3 TIMES DAILY
Status: DISCONTINUED | OUTPATIENT
Start: 2022-02-12 | End: 2022-02-22

## 2022-02-12 RX ORDER — IOHEXOL 350 MG/ML
100 INJECTION, SOLUTION INTRAVENOUS
Status: COMPLETED | OUTPATIENT
Start: 2022-02-12 | End: 2022-02-12

## 2022-02-12 RX ORDER — GUAIFENESIN 600 MG
1200 TABLET, EXTENDED RELEASE 12 HR ORAL 2 TIMES DAILY
Status: DISCONTINUED | OUTPATIENT
Start: 2022-02-12 | End: 2022-02-22

## 2022-02-12 RX ORDER — VANCOMYCIN 2 GRAM/500 ML IN 0.9 % SODIUM CHLORIDE INTRAVENOUS
25 ONCE
Status: COMPLETED | OUTPATIENT
Start: 2022-02-12 | End: 2022-02-13

## 2022-02-12 RX ORDER — MONTELUKAST SODIUM 10 MG/1
10 TABLET ORAL NIGHTLY
Status: DISCONTINUED | OUTPATIENT
Start: 2022-02-12 | End: 2022-02-22

## 2022-02-12 RX ORDER — ACETAMINOPHEN 500 MG
1000 TABLET ORAL ONCE
Status: COMPLETED | OUTPATIENT
Start: 2022-02-12 | End: 2022-02-12

## 2022-02-12 RX ORDER — ASCORBIC ACID 500 MG
500 TABLET ORAL DAILY
Status: DISCONTINUED | OUTPATIENT
Start: 2022-02-13 | End: 2022-02-22

## 2022-02-12 RX ORDER — VANCOMYCIN HYDROCHLORIDE
15 EVERY 12 HOURS
Status: DISCONTINUED | OUTPATIENT
Start: 2022-02-13 | End: 2022-02-13

## 2022-02-12 RX ORDER — METOCLOPRAMIDE HYDROCHLORIDE 5 MG/ML
10 INJECTION INTRAMUSCULAR; INTRAVENOUS EVERY 8 HOURS PRN
Status: DISCONTINUED | OUTPATIENT
Start: 2022-02-12 | End: 2022-02-22

## 2022-02-12 RX ORDER — ONDANSETRON 2 MG/ML
4 INJECTION INTRAMUSCULAR; INTRAVENOUS EVERY 6 HOURS PRN
Status: DISCONTINUED | OUTPATIENT
Start: 2022-02-12 | End: 2022-02-22

## 2022-02-12 RX ORDER — ALBUTEROL SULFATE 2.5 MG/3ML
2.5 SOLUTION RESPIRATORY (INHALATION) EVERY 6 HOURS PRN
Status: DISCONTINUED | OUTPATIENT
Start: 2022-02-12 | End: 2022-02-22

## 2022-02-12 RX ORDER — MELATONIN
2000 2 TIMES DAILY
Status: DISCONTINUED | OUTPATIENT
Start: 2022-02-12 | End: 2022-02-22

## 2022-02-12 RX ORDER — ACETAMINOPHEN 325 MG/1
650 TABLET ORAL EVERY 6 HOURS PRN
Status: DISCONTINUED | OUTPATIENT
Start: 2022-02-12 | End: 2022-02-22

## 2022-02-12 RX ORDER — FLUTICASONE PROPIONATE 50 MCG
2 SPRAY, SUSPENSION (ML) NASAL DAILY
Status: DISCONTINUED | OUTPATIENT
Start: 2022-02-12 | End: 2022-02-22

## 2022-02-12 RX ORDER — BUDESONIDE 0.5 MG/2ML
0.5 INHALANT ORAL
Status: DISCONTINUED | OUTPATIENT
Start: 2022-02-13 | End: 2022-02-22

## 2022-02-12 RX ORDER — ENOXAPARIN SODIUM 100 MG/ML
40 INJECTION SUBCUTANEOUS DAILY
Status: DISCONTINUED | OUTPATIENT
Start: 2022-02-13 | End: 2022-02-22

## 2022-02-12 RX ORDER — ECHINACEA PURPUREA EXTRACT 125 MG
1 TABLET ORAL
Status: DISCONTINUED | OUTPATIENT
Start: 2022-02-12 | End: 2022-02-22

## 2022-02-12 NOTE — ED QUICK NOTES
Orders for admission, patient is aware of plan and ready to go upstairs. Any questions, please call ED RN Gardner Hodgkins at extension #[phone].      Patient Covid vaccination status: Unvaccinated     COVID Test Ordered in ED: Respiratory Flu Expanded Panel + COVID-19    COVID Suspicion at Admission: N/A    Running Infusions:  none    Mental Status/LOC at time of transport: A&Ox4    Other pertinent information:   CIWA score: N/A   NIH score:  N/A

## 2022-02-13 ENCOUNTER — APPOINTMENT (OUTPATIENT)
Dept: CV DIAGNOSTICS | Facility: HOSPITAL | Age: 72
DRG: 190 | End: 2022-02-13
Attending: HOSPITALIST
Payer: MEDICARE

## 2022-02-13 LAB
ALBUMIN SERPL-MCNC: 2.9 G/DL (ref 3.4–5)
ALBUMIN/GLOB SERPL: 0.9 {RATIO} (ref 1–2)
ALP LIVER SERPL-CCNC: 62 U/L
ANION GAP SERPL CALC-SCNC: 9 MMOL/L (ref 0–18)
ARTERIAL PATENCY WRIST A: POSITIVE
AST SERPL-CCNC: 23 U/L (ref 15–37)
ATRIAL RATE: 117 BPM
BASOPHILS # BLD AUTO: 0 X10(3) UL (ref 0–0.2)
BASOPHILS NFR BLD AUTO: 0 %
BILIRUB SERPL-MCNC: 0.6 MG/DL (ref 0.1–2)
BODY TEMPERATURE: 98.6 F
BUN BLD-MCNC: 17 MG/DL (ref 7–18)
CALCIUM BLD-MCNC: 8.5 MG/DL (ref 8.5–10.1)
CHLORIDE SERPL-SCNC: 106 MMOL/L (ref 98–112)
CO2 SERPL-SCNC: 23 MMOL/L (ref 21–32)
COHGB MFR BLD: 1.9 % SAT (ref 0–3)
CREAT BLD-MCNC: 0.76 MG/DL
EOSINOPHIL # BLD AUTO: 0 X10(3) UL (ref 0–0.7)
EOSINOPHIL NFR BLD AUTO: 0 %
FIO2: 80 %
GLOBULIN PLAS-MCNC: 3.3 G/DL (ref 2.8–4.4)
GLUCOSE BLD-MCNC: 145 MG/DL (ref 70–99)
HCO3 BLDA-SCNC: 24.5 MEQ/L (ref 21–27)
HCT VFR BLD AUTO: 39.5 %
HGB BLD-MCNC: 12.9 G/DL
HGB BLD-MCNC: 13.4 G/DL
IMM GRANULOCYTES # BLD AUTO: 0.03 X10(3) UL (ref 0–1)
IMM GRANULOCYTES NFR BLD: 0.5 %
L PNEUMO AG UR QL: NEGATIVE
L/M: 40 L/MIN
LYMPHOCYTES # BLD AUTO: 0.34 X10(3) UL (ref 1–4)
LYMPHOCYTES NFR BLD AUTO: 5.5 %
MAGNESIUM SERPL-MCNC: 1.7 MG/DL (ref 1.7–2.8)
MAGNESIUM SERPL-MCNC: 1.9 MG/DL (ref 1.7–2.8)
MCH RBC QN AUTO: 32.3 PG (ref 26–34)
MCHC RBC AUTO-ENTMCNC: 32.7 G/DL (ref 31–37)
MCV RBC AUTO: 99 FL
METHGB MFR BLD: 0.9 % SAT (ref 0.4–1.5)
MONOCYTES # BLD AUTO: 0.14 X10(3) UL (ref 0.1–1)
MONOCYTES NFR BLD AUTO: 2.3 %
MRSA DNA SPEC QL NAA+PROBE: NEGATIVE
NEUTROPHILS # BLD AUTO: 5.64 X10 (3) UL (ref 1.5–7.7)
NEUTROPHILS # BLD AUTO: 5.64 X10(3) UL (ref 1.5–7.7)
NEUTROPHILS NFR BLD AUTO: 91.7 %
OSMOLALITY SERPL CALC.SUM OF ELEC: 290 MOSM/KG (ref 275–295)
P AXIS: 51 DEGREES
P-R INTERVAL: 128 MS
PH BLDA: 7.45 [PH] (ref 7.35–7.45)
PLATELET # BLD AUTO: 183 10(3)UL (ref 150–450)
PO2 BLDA: 80 MM HG (ref 80–100)
POTASSIUM SERPL-SCNC: 4.6 MMOL/L (ref 3.5–5.1)
PROT SERPL-MCNC: 6.2 G/DL (ref 6.4–8.2)
Q-T INTERVAL: 318 MS
QRS DURATION: 126 MS
QTC CALCULATION (BEZET): 443 MS
R AXIS: 139 DEGREES
RBC # BLD AUTO: 3.99 X10(6)UL
SODIUM SERPL-SCNC: 138 MMOL/L (ref 136–145)
STREP PNEUMO ANTIGEN, URINE: NEGATIVE
T AXIS: 44 DEGREES
VENTRICULAR RATE: 117 BPM
WBC # BLD AUTO: 6.2 X10(3) UL (ref 4–11)

## 2022-02-13 PROCEDURE — 93306 TTE W/DOPPLER COMPLETE: CPT | Performed by: HOSPITALIST

## 2022-02-13 PROCEDURE — 99233 SBSQ HOSP IP/OBS HIGH 50: CPT | Performed by: HOSPITALIST

## 2022-02-13 PROCEDURE — 99497 ADVNCD CARE PLAN 30 MIN: CPT | Performed by: HOSPITALIST

## 2022-02-13 RX ORDER — METHYLPREDNISOLONE SODIUM SUCCINATE 40 MG/ML
40 INJECTION, POWDER, LYOPHILIZED, FOR SOLUTION INTRAMUSCULAR; INTRAVENOUS EVERY 8 HOURS
Status: DISCONTINUED | OUTPATIENT
Start: 2022-02-13 | End: 2022-02-16

## 2022-02-13 RX ORDER — MAGNESIUM SULFATE HEPTAHYDRATE 40 MG/ML
2 INJECTION, SOLUTION INTRAVENOUS ONCE
Status: COMPLETED | OUTPATIENT
Start: 2022-02-13 | End: 2022-02-13

## 2022-02-13 RX ORDER — IPRATROPIUM BROMIDE AND ALBUTEROL SULFATE 2.5; .5 MG/3ML; MG/3ML
3 SOLUTION RESPIRATORY (INHALATION)
Status: DISCONTINUED | OUTPATIENT
Start: 2022-02-13 | End: 2022-02-14

## 2022-02-13 NOTE — BH PROGRESS NOTE
PATIENT ARRIVED FROM THE E.D. ON O2 AT 15L/HFNC. HE WAS SATURATING IN THE MID 90'S AT REST BUT DESATURATED TO THE 80'S WITH ACTIVITY. HE SAID \"THE BED DOWNSTAIRS WAS TOO UNCOMFORTABLE\" THAT HE PREFERRED TO STAND AND WALK AROUND IN THE ROOM FOR ABOUT 15 MINS OF HIS ARRIVAL. HE WAS ANXIOUS, SOMEWHAT RESTLESS. AT AROUND 2230, HE STARTED TO DESATURATE TO THE 70''S. (HE RECEIVED HIS IV SOLUMEDROL AT 2207)   FOR A WHILE, HE WAS ABLE TO RECOVER BY TAKING DEEP BREATHS BUT ABOUT AN HOUR LATER, HE JUST STAYED IN THE 80'S AT REST. RT WAS NOTIFIED. PATIENT WAS PUT ON VAPOTHERM, 80% 40L. DR Alan Wilson NOTIFIED. ABG WAS ORDERED. RT NOTIFIED.

## 2022-02-13 NOTE — ED QUICK NOTES
Pt came in by EMS on 15L oxy mask and switched to 15 L high flow nasal cannula by RT. Pt maintaining Sp02 level on high flow nasal cannula.

## 2022-02-13 NOTE — PLAN OF CARE
Problem: Patient/Family Goals  Goal: Patient/Family Long Term Goal  Description: Patient's Long Term Goal:   \"I CAME IN FOR TREATMENT SO I CAN G HOME HEALTHY AGAIN\"    Interventions:  IV STEROID, IV ANTIBX  CARRY OUT PLAN OF CARE  - See additional Care Plan goals for specific interventions  Outcome: Progressing  Goal: Patient/Family Short Term Goal  Description: Patient's Short Term Goal:   02/12/2022 - KEEP SATS ABOVE 90%    Interventions:   - SUPPLEMENTAL O2 NEEDED  - See additional Care Plan goals for specific interventions  Outcome: Progressing     Problem: RESPIRATORY - ADULT  Goal: Achieves optimal ventilation and oxygenation  Description: INTERVENTIONS:  - Assess for changes in respiratory status  - Assess for changes in mentation and behavior  - Position to facilitate oxygenation and minimize respiratory effort  - Oxygen supplementation based on oxygen saturation or ABGs  - Provide Smoking Cessation handout, if applicable  - Encourage broncho-pulmonary hygiene including cough, deep breathe, Incentive Spirometry  - Assess the need for suctioning and perform as needed  - Assess and instruct to report SOB or any respiratory difficulty  - Respiratory Therapy support as indicated  - Manage/alleviate anxiety  - Monitor for signs/symptoms of CO2 retention  Outcome: Progressing     Problem: RISK FOR INFECTION - ADULT  Goal: Absence of fever/infection during anticipated neutropenic period  Description: INTERVENTIONS  - Monitor WBC  - Administer growth factors as ordered  - Implement neutropenic guidelines  Outcome: Progressing     Problem: SAFETY ADULT - FALL  Goal: Free from fall injury  Description: INTERVENTIONS:  - Assess pt frequently for physical needs  - Identify cognitive and physical deficits and behaviors that affect risk of falls.   - [place] fall precautions as indicated by assessment.  - Educate pt/family on patient safety including physical limitations  - Instruct pt to call for assistance with activity based on assessment  - Modify environment to reduce risk of injury  - Provide assistive devices as appropriate  - Consider OT/PT consult to assist with strengthening/mobility  - Encourage toileting schedule  Outcome: Progressing     Problem: DISCHARGE PLANNING  Goal: Discharge to home or other facility with appropriate resources  Description: INTERVENTIONS:  - Identify barriers to discharge w/pt and caregiver  - Include patient/family/discharge partner in discharge planning  - Arrange for needed discharge resources and transportation as appropriate  - Identify discharge learning needs (meds, wound care, etc)  - Arrange for interpreters to assist at discharge as needed  - Consider post-discharge preferences of patient/family/discharge partner  - Complete POLST form as appropriate  - Assess patient's ability to be responsible for managing their own health  - Refer to Case Management Department for coordinating discharge planning if the patient needs post-hospital services based on physician/LIP order or complex needs related to functional status, cognitive ability or social support system  Outcome: Progressing

## 2022-02-13 NOTE — PROGRESS NOTES
[facility] Dosing Service    Initial Pharmacokinetic Consult for Vancomycin AUC Dosing    Romana Lipa is a 67year old patient who is being treated for pneumonia. Pharmacy has been asked to dose vancomycin by Dr. Syeda Posey. Weights:  Ideal body weight: 75.3 kg (166 lb 0.1 oz)  Adjusted ideal body weight: 81.6 kg (179 lb 13.6 oz)  Actual weight:  91 kg (200 lb 9.9 oz)    Labs:  Lab Results   Component Value Date    CREATSERUM 1.12 02/12/2022      CrCl:  Estimated Creatinine Clearance: 63.5 mL/min (based on SCr of 1.12 mg/dL). Based on the above:    1. This patient will receive a loading dose of Vancomycin  2000 mg IVPB (25mg/kg, capped at 2000 mg) x 1 dose. This will be followed by 1500 mg Q 12 hours based upon actual body weight of 91 kg and renal function. 2. Vancomycin peak and trough will be obtained at steady state in order to calculate AUC24. Goal AUC24 is 400-600 mg-h/L.    3. Pharmacy will order SCr as clinically indicated while on vancomycin to assess renal function. 4. Pharmacy will follow and monitor renal function, toxicity and efficacy. We appreciate the opportunity to assist in the care of this patient.     Martha Sanz, PharmD  2/12/2022  9:31 PM  [address] [place] [address] Extension: [phone]

## 2022-02-13 NOTE — PROGRESS NOTES
NURSING ADMISSION NOTE      Patient admitted via Cart  Oriented to room. Safety precautions initiated. Bed in low position. Call light in reach.     Admission navigator done with patient and wife at bedside

## 2022-02-13 NOTE — PROGRESS NOTES
[facility] Pharmacy Note: Dose Adjustment for piperacillin/tazobactam (Brian Gonzalez)    Gus Argueta is a 67year old patient who has been prescribed piperacillin/tazobactam (ZOSYN) 4.5 gm every 8 hrs. The estimated creatinine clearance is 63.5 mL/min (based on SCr of 1.12 mg/dL). The dose has been adjusted to piperacillin/tazobactam (ZOSYN) 3.375 gm every 8 hrs per hospital dose adjustment protocol for treatment of pneumonia. Pharmacy will follow and adjust dose as warranted for additional renal function changes.     Thank you,    Garland Kwan, PharmD  2/12/2022  9:35 PM

## 2022-02-14 LAB
ANION GAP SERPL CALC-SCNC: 5 MMOL/L (ref 0–18)
BUN BLD-MCNC: 16 MG/DL (ref 7–18)
C DIFF TOX B STL QL: NEGATIVE
CALCIUM BLD-MCNC: 8.4 MG/DL (ref 8.5–10.1)
CHLORIDE SERPL-SCNC: 108 MMOL/L (ref 98–112)
CO2 SERPL-SCNC: 26 MMOL/L (ref 21–32)
CREAT BLD-MCNC: 0.69 MG/DL
GLUCOSE BLD-MCNC: 145 MG/DL (ref 70–99)
MAGNESIUM SERPL-MCNC: 2.2 MG/DL (ref 1.6–2.6)
OSMOLALITY SERPL CALC.SUM OF ELEC: 292 MOSM/KG (ref 275–295)
POTASSIUM SERPL-SCNC: 4.4 MMOL/L (ref 3.5–5.1)
SODIUM SERPL-SCNC: 139 MMOL/L (ref 136–145)

## 2022-02-14 PROCEDURE — 99233 SBSQ HOSP IP/OBS HIGH 50: CPT | Performed by: HOSPITALIST

## 2022-02-14 RX ORDER — IPRATROPIUM BROMIDE AND ALBUTEROL SULFATE 2.5; .5 MG/3ML; MG/3ML
3 SOLUTION RESPIRATORY (INHALATION)
Status: DISCONTINUED | OUTPATIENT
Start: 2022-02-14 | End: 2022-02-19

## 2022-02-14 RX ORDER — SODIUM CHLORIDE/ALOE VERA
GEL (GRAM) NASAL AS NEEDED
Status: DISCONTINUED | OUTPATIENT
Start: 2022-02-14 | End: 2022-02-22

## 2022-02-14 NOTE — PLAN OF CARE
PATIENT A&OX4. CALMER TONIGHT. REMAINS ON VAPOTHERM 35L, 60% AND SATURATING IN THE HIGH 90'S at  REST, 70'S WITH ACTIVITY.  + CONGESTED, NON PRODUCTIVE COUGH. I.S. AND FLUTTER VALVE ENCOURAGED - FAIR COMPLIANCE.  VOIDING CLEAR YELLOW URINE PER URINAL   HE DENIES NAUSEA, NO VOMITING, NO STOOL/DIARRHEA. IV ZOSYN AND IV ZITHROMAX as ORDERED. AFEBRILE.      Problem: Patient/Family Goals  Goal: Patient/Family Long Term Goal  Description: Patient's Long Term Goal:   \"I CAME IN FOR TREATMENT SO I CAN G HOME HEALTHY AGAIN\"    Interventions:  IV STEROID, IV ANTIBX  CARRY OUT PLAN OF CARE  - See additional Care Plan goals for specific interventions  Outcome: Progressing  Goal: Patient/Family Short Term Goal  Description: Patient's Short Term Goal:   02/12/2022 - KEEP SATS ABOVE 90%  02/13/2022 - KEEP SATS ABOVE 90%, WEAN O2 as ABLE    Interventions:   - SUPPLEMENTAL O2 NEEDED  - See additional Care Plan goals for specific interventions  Outcome: Progressing     Problem: RESPIRATORY - ADULT  Goal: Achieves optimal ventilation and oxygenation  Description: INTERVENTIONS:  - Assess for changes in respiratory status  - Assess for changes in mentation and behavior  - Position to facilitate oxygenation and minimize respiratory effort  - Oxygen supplementation based on oxygen saturation or ABGs  - Provide Smoking Cessation handout, if applicable  - Encourage broncho-pulmonary hygiene including cough, deep breathe, Incentive Spirometry  - Assess the need for suctioning and perform as needed  - Assess and instruct to report SOB or any respiratory difficulty  - Respiratory Therapy support as indicated  - Manage/alleviate anxiety  - Monitor for signs/symptoms of CO2 retention  Outcome: Progressing     Problem: RISK FOR INFECTION - ADULT  Goal: Absence of fever/infection during anticipated neutropenic period  Description: INTERVENTIONS  - Monitor WBC  - Administer growth factors as ordered  - Implement neutropenic guidelines  Outcome: Progressing     Problem: SAFETY ADULT - FALL  Goal: Free from fall injury  Description: INTERVENTIONS:  - Assess pt frequently for physical needs  - Identify cognitive and physical deficits and behaviors that affect risk of falls.   - [place] fall precautions as indicated by assessment.  - Educate pt/family on patient safety including physical limitations  - Instruct pt to call for assistance with activity based on assessment  - Modify environment to reduce risk of injury  - Provide assistive devices as appropriate  - Consider OT/PT consult to assist with strengthening/mobility  - Encourage toileting schedule  Outcome: Progressing     Problem: DISCHARGE PLANNING  Goal: Discharge to home or other facility with appropriate resources  Description: INTERVENTIONS:  - Identify barriers to discharge w/pt and caregiver  - Include patient/family/discharge partner in discharge planning  - Arrange for needed discharge resources and transportation as appropriate  - Identify discharge learning needs (meds, wound care, etc)  - Arrange for interpreters to assist at discharge as needed  - Consider post-discharge preferences of patient/family/discharge partner  - Complete POLST form as appropriate  - Assess patient's ability to be responsible for managing their own health  - Refer to Case Management Department for coordinating discharge planning if the patient needs post-hospital services based on physician/LIP order or complex needs related to functional status, cognitive ability or social support system  Outcome: Progressing

## 2022-02-14 NOTE — BH PROGRESS NOTE
Called the nurse earlier and asked her if the patient needed the liaison or psychiatrist.  It was decided the liaison for referrals for therapy. This liaison will see the patient tomorrow.

## 2022-02-14 NOTE — PLAN OF CARE
Pt A&Ox4. Sats > 90% on 30L and 50% Fio2. BL 3-4L @ home. Nebs. Weaning as tolerated. Flutter and IS present at the bedside, education given during this shift. NSR on tele. VSS. Loveonx. Pt is able to ambulate with SB. Cardiac diet, tolerating well. IV abx and steroids. Pt voids per the urinal and Bedside commode. Pt and wife updated on the plan of care and verbalized understanding. Medications given per MAR. Pt had no new needs at this time.    Problem: Patient/Family Goals  Goal: Patient/Family Long Term Goal  Description: Patient's Long Term Goal:   \"I CAME IN FOR TREATMENT SO I CAN G HOME HEALTHY AGAIN\"    Interventions:  IV STEROID, IV ANTIBX  CARRY OUT PLAN OF CARE  - See additional Care Plan goals for specific interventions  Outcome: Progressing  Goal: Patient/Family Short Term Goal  Description: Patient's Short Term Goal:   02/12/2022 - KEEP SATS ABOVE 90%  02/13/2022 - KEEP SATS ABOVE 90%, WEAN O2 as ABLE  2/14: wean o2 as tolerated     Interventions:   - SUPPLEMENTAL O2 NEEDED  - See additional Care Plan goals for specific interventions  Outcome: Progressing     Problem: RESPIRATORY - ADULT  Goal: Achieves optimal ventilation and oxygenation  Description: INTERVENTIONS:  - Assess for changes in respiratory status  - Assess for changes in mentation and behavior  - Position to facilitate oxygenation and minimize respiratory effort  - Oxygen supplementation based on oxygen saturation or ABGs  - Provide Smoking Cessation handout, if applicable  - Encourage broncho-pulmonary hygiene including cough, deep breathe, Incentive Spirometry  - Assess the need for suctioning and perform as needed  - Assess and instruct to report SOB or any respiratory difficulty  - Respiratory Therapy support as indicated  - Manage/alleviate anxiety  - Monitor for signs/symptoms of CO2 retention  Outcome: Progressing     Problem: RISK FOR INFECTION - ADULT  Goal: Absence of fever/infection during anticipated neutropenic period  Description: INTERVENTIONS  - Monitor WBC  - Administer growth factors as ordered  - Implement neutropenic guidelines  Outcome: Progressing     Problem: SAFETY ADULT - FALL  Goal: Free from fall injury  Description: INTERVENTIONS:  - Assess pt frequently for physical needs  - Identify cognitive and physical deficits and behaviors that affect risk of falls.   - [place] fall precautions as indicated by assessment.  - Educate pt/family on patient safety including physical limitations  - Instruct pt to call for assistance with activity based on assessment  - Modify environment to reduce risk of injury  - Provide assistive devices as appropriate  - Consider OT/PT consult to assist with strengthening/mobility  - Encourage toileting schedule  Outcome: Progressing     Problem: DISCHARGE PLANNING  Goal: Discharge to home or other facility with appropriate resources  Description: INTERVENTIONS:  - Identify barriers to discharge w/pt and caregiver  - Include patient/family/discharge partner in discharge planning  - Arrange for needed discharge resources and transportation as appropriate  - Identify discharge learning needs (meds, wound care, etc)  - Arrange for interpreters to assist at discharge as needed  - Consider post-discharge preferences of patient/family/discharge partner  - Complete POLST form as appropriate  - Assess patient's ability to be responsible for managing their own health  - Refer to Case Management Department for coordinating discharge planning if the patient needs post-hospital services based on physician/LIP order or complex needs related to functional status, cognitive ability or social support system  Outcome: Progressing

## 2022-02-15 PROCEDURE — 99233 SBSQ HOSP IP/OBS HIGH 50: CPT | Performed by: HOSPITALIST

## 2022-02-15 RX ORDER — CODEINE PHOSPHATE AND GUAIFENESIN 10; 100 MG/5ML; MG/5ML
5 SOLUTION ORAL EVERY 4 HOURS PRN
Status: DISCONTINUED | OUTPATIENT
Start: 2022-02-15 | End: 2022-02-22

## 2022-02-15 RX ORDER — FUROSEMIDE 10 MG/ML
20 INJECTION INTRAMUSCULAR; INTRAVENOUS ONCE
Status: COMPLETED | OUTPATIENT
Start: 2022-02-15 | End: 2022-02-15

## 2022-02-15 RX ORDER — LOPERAMIDE HYDROCHLORIDE 2 MG/1
2 CAPSULE ORAL 4 TIMES DAILY PRN
Status: DISCONTINUED | OUTPATIENT
Start: 2022-02-15 | End: 2022-02-22

## 2022-02-15 RX ORDER — ECHINACEA PURPUREA EXTRACT 125 MG
1 TABLET ORAL
Status: DISCONTINUED | OUTPATIENT
Start: 2022-02-15 | End: 2022-02-22

## 2022-02-15 NOTE — PLAN OF CARE
A&OX4.  NOW ON O2 AT 12L/HFNC  AND SATURATING IN THE MID 90'S AT REST, 80's WITH ACTIVITY (STANDING USING THE URINAL). \"I STILL FEEL A LITTLE SHORT OF BREATH JUST SITTING HERE TALKING TO Anthony Archie". DENIES CHEST PAINS/ DIZZINESS/LIGHTHEADEDNESS,  +OCCASIONAL CONGESTED, NON PRODICTIVE COUGH. NO FEVERS. NEEDS ENCOURAGEMENT TO USE I.S. AND FLUTTER VALVE. DENIES NAUSEA, NO VOMITING, NO STOOL/DIARRHEA. IV ZITHROMAX, IV ZOSYN AND IV SOLUMEDROL as ORDERED. Mobility Goal:  UP IN CHAIR FOR MEALS    Readmission Risk:     [] Low     [x] Medium     [] High    Active issue(s) requiring resolution prior to discharge:   HIGH O2 NEED  STILL ON IV ANTIBX AND IV STEROIDS      Anticipated discharge date: TBD    Current discharge plan: TBD    F/U appointment scheduled within 7 days. .. [] [facility] ([facility]     [] Pulmonologist     [] Primary Care Physician     [] Other Specialty    Watched the home discharge recovery videos related to diagnosis. .. [] Pneumonia     [x] COPD    [] Home Health set up. [x] Care partner identified and updated with the plan of care.       Problem: Patient/Family Goals  Goal: Patient/Family Long Term Goal  Description: Patient's Long Term Goal:   \"I CAME IN FOR TREATMENT SO I CAN G HOME HEALTHY AGAIN\"    Interventions:  IV STEROID, IV ANTIBX  CARRY OUT PLAN OF CARE  - See additional Care Plan goals for specific interventions  Outcome: Progressing  Goal: Patient/Family Short Term Goal  Description: Patient's Short Term Goal:   02/12/2022 - KEEP SATS ABOVE 90%  02/13/2022 - KEEP SATS ABOVE 90%, WEAN O2 as ABLE  2/14: wean o2 as tolerated   02/14/2022- WEAN O2 as ABLE  Interventions:   - SUPPLEMENTAL O2 NEEDED  - See additional Care Plan goals for specific interventions  Outcome: Progressing     Problem: RESPIRATORY - ADULT  Goal: Achieves optimal ventilation and oxygenation  Description: INTERVENTIONS:  - Assess for changes in respiratory status  - Assess for changes in mentation and behavior  - Position to facilitate oxygenation and minimize respiratory effort  - Oxygen supplementation based on oxygen saturation or ABGs  - Provide Smoking Cessation handout, if applicable  - Encourage broncho-pulmonary hygiene including cough, deep breathe, Incentive Spirometry  - Assess the need for suctioning and perform as needed  - Assess and instruct to report SOB or any respiratory difficulty  - Respiratory Therapy support as indicated  - Manage/alleviate anxiety  - Monitor for signs/symptoms of CO2 retention  Outcome: Progressing     Problem: RISK FOR INFECTION - ADULT  Goal: Absence of fever/infection during anticipated neutropenic period  Description: INTERVENTIONS  - Monitor WBC  - Administer growth factors as ordered  - Implement neutropenic guidelines  Outcome: Progressing     Problem: SAFETY ADULT - FALL  Goal: Free from fall injury  Description: INTERVENTIONS:  - Assess pt frequently for physical needs  - Identify cognitive and physical deficits and behaviors that affect risk of falls.   - [place] fall precautions as indicated by assessment.  - Educate pt/family on patient safety including physical limitations  - Instruct pt to call for assistance with activity based on assessment  - Modify environment to reduce risk of injury  - Provide assistive devices as appropriate  - Consider OT/PT consult to assist with strengthening/mobility  - Encourage toileting schedule  Outcome: Progressing     Problem: DISCHARGE PLANNING  Goal: Discharge to home or other facility with appropriate resources  Description: INTERVENTIONS:  - Identify barriers to discharge w/pt and caregiver  - Include patient/family/discharge partner in discharge planning  - Arrange for needed discharge resources and transportation as appropriate  - Identify discharge learning needs (meds, wound care, etc)  - Arrange for interpreters to assist at discharge as needed  - Consider post-discharge preferences of patient/family/discharge partner  - Complete POLST form as appropriate  - Assess patient's ability to be responsible for managing their own health  - Refer to Case Management Department for coordinating discharge planning if the patient needs post-hospital services based on physician/LIP order or complex needs related to functional status, cognitive ability or social support system  Outcome: Progressing

## 2022-02-15 NOTE — PLAN OF CARE
Pt A&Ox4. Sats > 90%  10L HFNC. BL 3-4L @ home. Nebs. Weaning as tolerated. Flutter and IS present at the bedside, education given during this shift. PRN antitussive and imodium. NSR on tele. VSS. Loveonx. Pt is able to ambulate with SB. Cardiac diet, tolerating well. IV abx and steroids. Pt voids per the urinal and Bedside commode. 1x dose of lasix iv given. Pt and wife updated on the plan of care and verbalized understanding. Medications given per MAR. Pt had no new needs at this time.    Problem: Patient/Family Goals  Goal: Patient/Family Long Term Goal  Description: Patient's Long Term Goal:   \"I CAME IN FOR TREATMENT SO I CAN G HOME HEALTHY AGAIN\"    Interventions:  IV STEROID, IV ANTIBX  CARRY OUT PLAN OF CARE  - See additional Care Plan goals for specific interventions  Outcome: Progressing  Goal: Patient/Family Short Term Goal  Description: Patient's Short Term Goal:   02/12/2022 - KEEP SATS ABOVE 90%  02/13/2022 - KEEP SATS ABOVE 90%, WEAN O2 as ABLE  2/14: wean o2 as tolerated   02/14/2022- WEAN O2 as ABLE  2/15 Am: wean O2 as tolerated and  the room   Interventions:   - SUPPLEMENTAL O2 NEEDED  - See additional Care Plan goals for specific interventions  Outcome: Progressing     Problem: RESPIRATORY - ADULT  Goal: Achieves optimal ventilation and oxygenation  Description: INTERVENTIONS:  - Assess for changes in respiratory status  - Assess for changes in mentation and behavior  - Position to facilitate oxygenation and minimize respiratory effort  - Oxygen supplementation based on oxygen saturation or ABGs  - Provide Smoking Cessation handout, if applicable  - Encourage broncho-pulmonary hygiene including cough, deep breathe, Incentive Spirometry  - Assess the need for suctioning and perform as needed  - Assess and instruct to report SOB or any respiratory difficulty  - Respiratory Therapy support as indicated  - Manage/alleviate anxiety  - Monitor for signs/symptoms of CO2 retention  Outcome: Progressing     Problem: RISK FOR INFECTION - ADULT  Goal: Absence of fever/infection during anticipated neutropenic period  Description: INTERVENTIONS  - Monitor WBC  - Administer growth factors as ordered  - Implement neutropenic guidelines  Outcome: Progressing     Problem: SAFETY ADULT - FALL  Goal: Free from fall injury  Description: INTERVENTIONS:  - Assess pt frequently for physical needs  - Identify cognitive and physical deficits and behaviors that affect risk of falls.   - [place] fall precautions as indicated by assessment.  - Educate pt/family on patient safety including physical limitations  - Instruct pt to call for assistance with activity based on assessment  - Modify environment to reduce risk of injury  - Provide assistive devices as appropriate  - Consider OT/PT consult to assist with strengthening/mobility  - Encourage toileting schedule  Outcome: Progressing     Problem: DISCHARGE PLANNING  Goal: Discharge to home or other facility with appropriate resources  Description: INTERVENTIONS:  - Identify barriers to discharge w/pt and caregiver  - Include patient/family/discharge partner in discharge planning  - Arrange for needed discharge resources and transportation as appropriate  - Identify discharge learning needs (meds, wound care, etc)  - Arrange for interpreters to assist at discharge as needed  - Consider post-discharge preferences of patient/family/discharge partner  - Complete POLST form as appropriate  - Assess patient's ability to be responsible for managing their own health  - Refer to Case Management Department for coordinating discharge planning if the patient needs post-hospital services based on physician/LIP order or complex needs related to functional status, cognitive ability or social support system  Outcome: Progressing

## 2022-02-15 NOTE — BH PROGRESS NOTE
Level of Care Assessment Note      General Questions  Why are you here?: Patient admits to not breating well. Precipitating Events: Patient talked to the hospitalist about seeing psychiatry due to him having anxiety/ depression. History of Present Illness: Patient states about 1 year ago he had to stop working due to his physical problems. He said, he is not able to do the physical things at home: cut grass shovel the snow etc like he use to. Robert Porter reports feeling less of a man not being able to do these things. He states now his wife who still works has to do a lot more. The patient said, he gets anxious and this turns into irritability and then has an agrument with his wife. He said, she already has too much to do and now has the things he use to do. He denies any suicidal thoughts. Referral Source  Referral Source: [facility]: Geovani Chopra 50 of information for CSSR: Patient  In what setting is the screener performed?: in person  1. Have you wished you were dead or wished you could go to sleep and not wake up? (past 30 days): No  2. Have you actually had any thoughts of killing yourself? (past 30 days): No  6.  Have you ever done anything, started to do anything, or prepared to do anything to end your life? (lifetime): No  Score - BH OV: No Risk  Is your experience of thoughts of dying by suicide: Frightening  Protective Factors: family  Past Suicidal Ideation: Denies  Family History or Personal Lived Experience of Loss or Near Loss by Suicide: Denies     Danger to Others/Property  Have you harmed someone or had thoughts about wanting someone harmed or killed in the past 30 days?: No  Have you harmed someone or had thoughts about wanting someone harmed or killed further back than 30 days?: No  Current or Past Harm Toward Animals: No  History or Allegations of Inappropriate Physical Contact: No  Have you ever damaged/destroyed property or thought about it?: No Access to Means  Has access to means to attempt suicide or harm others or property: No  Access to Firearm/Weapon: No  Do you have a firearm owner ID card?: No     Self Injury  History of Self-Injurious Behaviors More than 30 Days Ago: No  Present Self-Injurious Behaviors Within the Last 30 Days: No     Mental Health Symptoms  Hallucination Type: No problems reported or observed  Delusions: No problems reported or observed  Depression Symptoms: Change in energy level;Feelings of helplessness; Feelings of worthlessness; Impaired concentration; Increased irritability  Depression Description: States on and off for the past year. Anxiety Symptoms: Generalized  Panic Attacks: Patient states this started about 1 year ago.   Bipolar Symptoms: No problems reported or observed  Sleep Pattern: Disturbed/interrupted sleep  Number of Sleep Hours: 7.5 Hours  Use of Sleep Aids: denies  Appetite Symptoms: Normal for patient  Unplanned Weight Loss: No  Unplanned Weight Gain: No  Active Eating Disorder: No     IBW Calculations  Weight: 200 lb 9.9 oz                                                                                                                                     Current/Previous MH/CD Providers  Hospitalizations, Placements, Therapy, Detox: No                                   Current/Previous MH/CD Treatment  Recovery Support Groups: 12 step groups (comment)  History of Seclusion/Restraint: No     Alcohol Use  How often do you have a drink containing alcohol? : Currently abstinent but used to drink   Alcohol Use  Age at first use?: 17  Route: Oral  Average amount used? : 3/4 a bottle of wine and 3 glasses of beer  How long with this pattern of use?: 30 + years  Last Use?: 1117     Illicit and Prescription Drug Use  Which if any illicit/prescription drugs have you used/abused?: Denies                                                                             Support for Recovery  Is your living environment a supportive place for recovery?: Yes  Describe: wife is supportive      Withdrawal Symptoms  History of Withdrawal Symptoms: Denies past symptoms  Current Withdrawal Symptoms: No     Compulsive Behaviors  Are you/others concerned about any of the following behaviors over the past 30 days?: Denies                                                       Functional Impairment  Currently Attending School: No  Employment Status: Retired  Concerns/Conflicts with Social Relationships: No  Decreased Functional Ability: Cook  Do you have any prior/current legal concerns?: None  History of Gang Involvement: No  Type of Residence: Private residence     Abuse Assessment  Physical Abuse: Denies  Verbal Abuse: Denies  Sexual Abuse: Denies  Neglect: Denies  Does anyone say or do something to you that makes you feel unsafe?: No  Have You Ever Been Harmed by a Partner/Caregiver?: No  Health Concerns r/t Abuse: No     Patient's legal sex: male   Patient identifies as: male   Patient's birth sex: male     General Appearance  Characteristics: Appropriate clothing  Eye Contact: Direct   Psychomotor Behavior  Gait/Movement: Normal  Abnormal movements: None  Posture: Stiff  Rate of Movement: Normal   Mood and Affect  Mood or Feelings: Sadness; Optimistic; Worthless  Appropriateness of Affect: Appropriate to situation  Range of Affect: Normal  Stability of Affect: Stable  Attitude toward staff: Friendly;Pleasant; Co-operative   Speech  Rate of Speech: Appropriate  Flow of Speech: Appropriate  Intensity of Volume: Ordinary   Cognition  Concentration: Unimpaired  Memory: Recent memory intact; Remote memory intact  Orientation Level: Oriented X4  Insight: Good  Judgment: Good   Thought Patterns  Clarity/Relevance: Coherent  Flow: Organized  Level of Consciousness: Alert   Level of Consciousness: Alert   Behavior  Exhibited behavior: Appropriate to situation     Assessment Summary  Assessment Summary: Patient states about 1 year ago he had to stop working due to his physical problems. He said, he is not able to do the physical things at home: cut grass shovel the snow etc like he use to. Angelina Hussein reports feeling less of a man not being able to do these things. He states now his wife who still works has to do a lot more. The patient said, he gets anxious and this turns into irritability and then has an agrument with his wife. He said, she already has too much to do and now has the things he use to do. He denies any suicidal thoughts. The patient wants to be placed on medication to help him with his anxiety/depression. Risk/Protective Factors  Risk Factors: Physical Illness; No current treatment  Protective Factors: family     Motivational Stage of Change  Motivational Stage of Change: Pre-Contemplative     Level of Care Recommendations  Consulted with: Dr. Garald Fothergill  Level of Care Recommendation: Outpatient  Outpatient Criteria: Regular therapy needed; Support needed  Referral 1: Follow up with his pcp for medication management  Referral 2: Follow up with a psychotherapist.

## 2022-02-15 NOTE — CM/SW NOTE
Pt currently on 10L 02 HFNC. Pt may need a bigger concentrator at home if he needs more 02 when he is ready for dc.

## 2022-02-16 LAB
ALBUMIN SERPL-MCNC: 3.1 G/DL (ref 3.4–5)
ALBUMIN/GLOB SERPL: 0.9 {RATIO} (ref 1–2)
ALP LIVER SERPL-CCNC: 70 U/L
ALT SERPL-CCNC: 52 U/L
ANION GAP SERPL CALC-SCNC: 8 MMOL/L (ref 0–18)
AST SERPL-CCNC: 17 U/L (ref 15–37)
BASOPHILS # BLD AUTO: 0.01 X10(3) UL (ref 0–0.2)
BASOPHILS NFR BLD AUTO: 0.1 %
BILIRUB SERPL-MCNC: 0.4 MG/DL (ref 0.1–2)
BUN BLD-MCNC: 18 MG/DL (ref 7–18)
CALCIUM BLD-MCNC: 8.7 MG/DL (ref 8.5–10.1)
CHLORIDE SERPL-SCNC: 103 MMOL/L (ref 98–112)
CO2 SERPL-SCNC: 27 MMOL/L (ref 21–32)
CREAT BLD-MCNC: 0.86 MG/DL
EOSINOPHIL # BLD AUTO: 0 X10(3) UL (ref 0–0.7)
EOSINOPHIL NFR BLD AUTO: 0 %
ERYTHROCYTE [DISTWIDTH] IN BLOOD BY AUTOMATED COUNT: 13.7 %
GLOBULIN PLAS-MCNC: 3.5 G/DL (ref 2.8–4.4)
GLUCOSE BLD-MCNC: 130 MG/DL (ref 70–99)
HCT VFR BLD AUTO: 43.3 %
HGB BLD-MCNC: 13.8 G/DL
IMM GRANULOCYTES # BLD AUTO: 0.08 X10(3) UL (ref 0–1)
IMM GRANULOCYTES NFR BLD: 0.9 %
LYMPHOCYTES # BLD AUTO: 0.33 X10(3) UL (ref 1–4)
LYMPHOCYTES NFR BLD AUTO: 3.7 %
MAGNESIUM SERPL-MCNC: 2 MG/DL (ref 1.6–2.6)
MCH RBC QN AUTO: 31.9 PG (ref 26–34)
MCHC RBC AUTO-ENTMCNC: 31.9 G/DL (ref 31–37)
MCV RBC AUTO: 100 FL
MONOCYTES # BLD AUTO: 0.23 X10(3) UL (ref 0.1–1)
MONOCYTES NFR BLD AUTO: 2.6 %
NEUTROPHILS # BLD AUTO: 8.26 X10 (3) UL (ref 1.5–7.7)
NEUTROPHILS # BLD AUTO: 8.26 X10(3) UL (ref 1.5–7.7)
NEUTROPHILS NFR BLD AUTO: 92.7 %
OSMOLALITY SERPL CALC.SUM OF ELEC: 290 MOSM/KG (ref 275–295)
PHOSPHATE SERPL-MCNC: 3.4 MG/DL (ref 2.5–4.9)
PLATELET # BLD AUTO: 225 10(3)UL (ref 150–450)
POTASSIUM SERPL-SCNC: 4.5 MMOL/L (ref 3.5–5.1)
PROT SERPL-MCNC: 6.6 G/DL (ref 6.4–8.2)
RBC # BLD AUTO: 4.33 X10(6)UL
SODIUM SERPL-SCNC: 138 MMOL/L (ref 136–145)
WBC # BLD AUTO: 8.9 X10(3) UL (ref 4–11)

## 2022-02-16 PROCEDURE — 99232 SBSQ HOSP IP/OBS MODERATE 35: CPT | Performed by: INTERNAL MEDICINE

## 2022-02-16 RX ORDER — METHYLPREDNISOLONE SODIUM SUCCINATE 40 MG/ML
40 INJECTION, POWDER, LYOPHILIZED, FOR SOLUTION INTRAMUSCULAR; INTRAVENOUS EVERY 12 HOURS
Status: DISCONTINUED | OUTPATIENT
Start: 2022-02-16 | End: 2022-02-22

## 2022-02-16 NOTE — PLAN OF CARE
Problem: Acute respiratory failure  Data: Ax04. Telemetry -sinus rhythm, occasional multifocal PVCs. Had 6 beats of V-tach overnight, asymptomatic .  9-10L HF overnight. Dry cough, prn robitussin w/ codeine given. Needs 15L with ambulation. Chronic back pain, prn acetaminophen given. Continent Voids. Regular diet with sodium restrict. Ambulatory. Left ankle wound, intact dressing and kerliex. Intervention: IV zosyn, IV solu-medrol, mucinex,   Education: Medications, call light  Response: Cooperative with care.      Problem: Patient/Family Goals  Goal: Patient/Family Long Term Goal  Description: Patient's Long Term Goal:   \"I CAME IN FOR TREATMENT SO I CAN G HOME HEALTHY AGAIN\"    Interventions:  IV STEROID, IV ANTIBX  CARRY OUT PLAN OF CARE  - See additional Care Plan goals for specific interventions  Outcome: Progressing  Goal: Patient/Family Short Term Goal  Description: Patient's Short Term Goal:   02/12/2022 - KEEP SATS ABOVE 90%  02/13/2022 - KEEP SATS ABOVE 90%, WEAN O2 as ABLE  2/14: wean o2 as tolerated   02/14/2022- WEAN O2 as ABLE  2/15 Am: wean O2 as tolerated and  the room   2/15: wean 02 needs    Interventions:   - SUPPLEMENTAL O2 NEEDED  - See additional Care Plan goals for specific interventions  Outcome: Progressing     Problem: RESPIRATORY - ADULT  Goal: Achieves optimal ventilation and oxygenation  Description: INTERVENTIONS:  - Assess for changes in respiratory status  - Assess for changes in mentation and behavior  - Position to facilitate oxygenation and minimize respiratory effort  - Oxygen supplementation based on oxygen saturation or ABGs  - Provide Smoking Cessation handout, if applicable  - Encourage broncho-pulmonary hygiene including cough, deep breathe, Incentive Spirometry  - Assess the need for suctioning and perform as needed  - Assess and instruct to report SOB or any respiratory difficulty  - Respiratory Therapy support as indicated  - Manage/alleviate anxiety  - Monitor for signs/symptoms of CO2 retention  Outcome: Progressing     Problem: RESPIRATORY - ADULT  Goal: Achieves optimal ventilation and oxygenation  Description: INTERVENTIONS:  - Assess for changes in respiratory status  - Assess for changes in mentation and behavior  - Position to facilitate oxygenation and minimize respiratory effort  - Oxygen supplementation based on oxygen saturation or ABGs  - Provide Smoking Cessation handout, if applicable  - Encourage broncho-pulmonary hygiene including cough, deep breathe, Incentive Spirometry  - Assess the need for suctioning and perform as needed  - Assess and instruct to report SOB or any respiratory difficulty  - Respiratory Therapy support as indicated  - Manage/alleviate anxiety  - Monitor for signs/symptoms of CO2 retention  Outcome: Progressing

## 2022-02-16 NOTE — PLAN OF CARE
Pt is AOx4. On 10L HFNC, needing 15L with exertion. Pt with strong cough, scheduled Tessalon given as ordered. NSR on telemetry. Cardiology consulted this morning. VSS. Up with standby assist, sat up in the chair all day. PRN Tylenol given for chronic back pain. Kerlix changed on foot wound per pt request, mepilex underneath is C/D/I. List of dermatologists given to patient to follow up with after discharge. Pt and wife at bedside updated on plan of care.      Problem: Patient/Family Goals  Goal: Patient/Family Long Term Goal  Description: Patient's Long Term Goal:   \"I CAME IN FOR TREATMENT SO I CAN G HOME HEALTHY AGAIN\"    Interventions:  IV STEROID, IV ANTIBX  CARRY OUT PLAN OF CARE  - See additional Care Plan goals for specific interventions  Outcome: Progressing  Goal: Patient/Family Short Term Goal  Description: Patient's Short Term Goal:   02/12/2022 - KEEP SATS ABOVE 90%  02/13/2022 - KEEP SATS ABOVE 90%, WEAN O2 as ABLE  2/14: wean o2 as tolerated   02/14/2022- WEAN O2 as ABLE  2/15 Am: wean O2 as tolerated and  the room   2/15: wean 02 needs    Interventions:   - SUPPLEMENTAL O2 NEEDED  - See additional Care Plan goals for specific interventions  Outcome: Progressing     Problem: RESPIRATORY - ADULT  Goal: Achieves optimal ventilation and oxygenation  Description: INTERVENTIONS:  - Assess for changes in respiratory status  - Assess for changes in mentation and behavior  - Position to facilitate oxygenation and minimize respiratory effort  - Oxygen supplementation based on oxygen saturation or ABGs  - Provide Smoking Cessation handout, if applicable  - Encourage broncho-pulmonary hygiene including cough, deep breathe, Incentive Spirometry  - Assess the need for suctioning and perform as needed  - Assess and instruct to report SOB or any respiratory difficulty  - Respiratory Therapy support as indicated  - Manage/alleviate anxiety  - Monitor for signs/symptoms of CO2 retention  Outcome: Progressing Problem: RISK FOR INFECTION - ADULT  Goal: Absence of fever/infection during anticipated neutropenic period  Description: INTERVENTIONS  - Monitor WBC  - Administer growth factors as ordered  - Implement neutropenic guidelines  Outcome: Progressing     Problem: SAFETY ADULT - FALL  Goal: Free from fall injury  Description: INTERVENTIONS:  - Assess pt frequently for physical needs  - Identify cognitive and physical deficits and behaviors that affect risk of falls.   - [place] fall precautions as indicated by assessment.  - Educate pt/family on patient safety including physical limitations  - Instruct pt to call for assistance with activity based on assessment  - Modify environment to reduce risk of injury  - Provide assistive devices as appropriate  - Consider OT/PT consult to assist with strengthening/mobility  - Encourage toileting schedule  Outcome: Progressing     Problem: DISCHARGE PLANNING  Goal: Discharge to home or other facility with appropriate resources  Description: INTERVENTIONS:  - Identify barriers to discharge w/pt and caregiver  - Include patient/family/discharge partner in discharge planning  - Arrange for needed discharge resources and transportation as appropriate  - Identify discharge learning needs (meds, wound care, etc)  - Arrange for interpreters to assist at discharge as needed  - Consider post-discharge preferences of patient/family/discharge partner  - Complete POLST form as appropriate  - Assess patient's ability to be responsible for managing their own health  - Refer to Case Management Department for coordinating discharge planning if the patient needs post-hospital services based on physician/LIP order or complex needs related to functional status, cognitive ability or social support system  Outcome: Progressing

## 2022-02-16 NOTE — CM/SW NOTE
SW met with patient in room to discuss discharge needs and complete initial assessment. Patient alert and oriented at time of assessment. Patient, 67year old male admitted for Acute Respiratory Failure. Patient lives at home with Wife. No stairs at home. Patient ambulates unassisted prior to admission. Patient independent with ADLS. Patient drives. Patient currently has 5L concentrator at home. Per RN, Rafael Robertson, patient may need higher concentrator at home, 10L. SW placed phone call to Roman Jiménez, representative for [employer]. Roman Messcorinne when patient is close to discharge, send over appropriate documentation in [address] and 10L concentrator can be dropped of at patients house within an hour. PLAN: await patient 02 needs to assess for higher concentrator at home    SW to remain available for an further needs for patient during current hospitalization.      Queen Kena LCSW  /Discharge Planner

## 2022-02-17 LAB
ANION GAP SERPL CALC-SCNC: 7 MMOL/L (ref 0–18)
BUN BLD-MCNC: 18 MG/DL (ref 7–18)
CALCIUM BLD-MCNC: 8.6 MG/DL (ref 8.5–10.1)
CHLORIDE SERPL-SCNC: 102 MMOL/L (ref 98–112)
CO2 SERPL-SCNC: 28 MMOL/L (ref 21–32)
CREAT BLD-MCNC: 0.81 MG/DL
GLUCOSE BLD-MCNC: 100 MG/DL (ref 70–99)
OSMOLALITY SERPL CALC.SUM OF ELEC: 286 MOSM/KG (ref 275–295)
POTASSIUM SERPL-SCNC: 4.8 MMOL/L (ref 3.5–5.1)
SODIUM SERPL-SCNC: 137 MMOL/L (ref 136–145)

## 2022-02-17 PROCEDURE — 99232 SBSQ HOSP IP/OBS MODERATE 35: CPT | Performed by: INTERNAL MEDICINE

## 2022-02-17 RX ORDER — AMLODIPINE BESYLATE 5 MG/1
5 TABLET ORAL DAILY
Status: DISCONTINUED | OUTPATIENT
Start: 2022-02-17 | End: 2022-02-22

## 2022-02-17 NOTE — PLAN OF CARE
Pt AOx4. Glasses at bedside. VSS, afebrile. On 10L HFNC and 15L with exertion. Cough meds given. Tele, NSR, lovenox. No c/o pain, N/V. Meds given for diarrhea, see mar. Up s/b for O2 needs. Regular diet tolerated. Lesion on nose. IV abx, steroids. Mepilex c/d/i on L foot. Will continue to wean O2. Pt updated on POC. Call light within reach. No further needs at this time. Will continue to round on pt.        Problem: Patient/Family Goals  Goal: Patient/Family Long Term Goal  Description: Patient's Long Term Goal:   \"I CAME IN FOR TREATMENT SO I CAN G HOME HEALTHY AGAIN\"    Interventions:  IV STEROID, IV ANTIBX  CARRY OUT PLAN OF CARE  - See additional Care Plan goals for specific interventions  Outcome: Progressing  Goal: Patient/Family Short Term Goal  Description: Patient's Short Term Goal:   02/12/2022 - KEEP SATS ABOVE 90%  02/13/2022 - KEEP SATS ABOVE 90%, WEAN O2 as ABLE  2/14: wean o2 as tolerated   02/14/2022- WEAN O2 as ABLE  2/15 Am: wean O2 as tolerated and  the room   2/15: wean 02 needs    Interventions:   - SUPPLEMENTAL O2 NEEDED  - See additional Care Plan goals for specific interventions  Outcome: Progressing     Problem: RESPIRATORY - ADULT  Goal: Achieves optimal ventilation and oxygenation  Description: INTERVENTIONS:  - Assess for changes in respiratory status  - Assess for changes in mentation and behavior  - Position to facilitate oxygenation and minimize respiratory effort  - Oxygen supplementation based on oxygen saturation or ABGs  - Provide Smoking Cessation handout, if applicable  - Encourage broncho-pulmonary hygiene including cough, deep breathe, Incentive Spirometry  - Assess the need for suctioning and perform as needed  - Assess and instruct to report SOB or any respiratory difficulty  - Respiratory Therapy support as indicated  - Manage/alleviate anxiety  - Monitor for signs/symptoms of CO2 retention  Outcome: Progressing     Problem: RISK FOR INFECTION - ADULT  Goal: Absence of fever/infection during anticipated neutropenic period  Description: INTERVENTIONS  - Monitor WBC  - Administer growth factors as ordered  - Implement neutropenic guidelines  Outcome: Progressing     Problem: SAFETY ADULT - FALL  Goal: Free from fall injury  Description: INTERVENTIONS:  - Assess pt frequently for physical needs  - Identify cognitive and physical deficits and behaviors that affect risk of falls.   - [place] fall precautions as indicated by assessment.  - Educate pt/family on patient safety including physical limitations  - Instruct pt to call for assistance with activity based on assessment  - Modify environment to reduce risk of injury  - Provide assistive devices as appropriate  - Consider OT/PT consult to assist with strengthening/mobility  - Encourage toileting schedule  Outcome: Progressing     Problem: DISCHARGE PLANNING  Goal: Discharge to home or other facility with appropriate resources  Description: INTERVENTIONS:  - Identify barriers to discharge w/pt and caregiver  - Include patient/family/discharge partner in discharge planning  - Arrange for needed discharge resources and transportation as appropriate  - Identify discharge learning needs (meds, wound care, etc)  - Arrange for interpreters to assist at discharge as needed  - Consider post-discharge preferences of patient/family/discharge partner  - Complete POLST form as appropriate  - Assess patient's ability to be responsible for managing their own health  - Refer to Case Management Department for coordinating discharge planning if the patient needs post-hospital services based on physician/LIP order or complex needs related to functional status, cognitive ability or social support system  Outcome: Progressing

## 2022-02-17 NOTE — PROGRESS NOTES
SP02 % ON ROOM AIR AT REST n/a, BASELINE 3 LITERS AT REST AT HOME SATS 87% , ON 6 LITERS AT REST SATS 90%  SP02 % AMBULATION ON ROOM AIR N/A, BASELINE 6 LITERS WITH AMBULATION AT HOME ON 6 LITERS SATS 78%, ON 4 LITERS WALKING=70%  SPO2% AMBULATION ON 02 92% ON  15 LITERS PER MINUTE

## 2022-02-17 NOTE — PLAN OF CARE
Problem: Patient/Family Goals  Goal: Patient/Family Long Term Goal  Description: Patient's Long Term Goal:   \"I CAME IN FOR TREATMENT SO I CAN G HOME HEALTHY AGAIN\"    Interventions:  IV STEROID, IV ANTIBX  CARRY OUT PLAN OF CARE  - See additional Care Plan goals for specific interventions  Outcome: Progressing  Goal: Patient/Family Short Term Goal  Description: Patient's Short Term Goal:   02/12/2022 - KEEP SATS ABOVE 90%  02/13/2022 - KEEP SATS ABOVE 90%, WEAN O2 as ABLE  2/14: wean o2 as tolerated   02/14/2022- WEAN O2 as ABLE  2/15 Am: wean O2 as tolerated and  the room   2/15: wean 02 needs  2/17/22 BE ABLE TO WALK AROUND WITH LESS OXYGEN\"    Interventions:   - SUPPLEMENTAL O2 NEEDED  - See additional Care Plan goals for specific interventions  Outcome: Progressing     Problem: RESPIRATORY - ADULT  Goal: Achieves optimal ventilation and oxygenation  Description: INTERVENTIONS:  - Assess for changes in respiratory status  - Assess for changes in mentation and behavior  - Position to facilitate oxygenation and minimize respiratory effort  - Oxygen supplementation based on oxygen saturation or ABGs  - Provide Smoking Cessation handout, if applicable  - Encourage broncho-pulmonary hygiene including cough, deep breathe, Incentive Spirometry  - Assess the need for suctioning and perform as needed  - Assess and instruct to report SOB or any respiratory difficulty  - Respiratory Therapy support as indicated  - Manage/alleviate anxiety  - Monitor for signs/symptoms of CO2 retention  Outcome: Progressing     Problem: RISK FOR INFECTION - ADULT  Goal: Absence of fever/infection during anticipated neutropenic period  Description: INTERVENTIONS  - Monitor WBC  - Administer growth factors as ordered  - Implement neutropenic guidelines  Outcome: Progressing     Problem: SAFETY ADULT - FALL  Goal: Free from fall injury  Description: INTERVENTIONS:  - Assess pt frequently for physical needs  - Identify cognitive and physical deficits and behaviors that affect risk of falls.   - [place] fall precautions as indicated by assessment.  - Educate pt/family on patient safety including physical limitations  - Instruct pt to call for assistance with activity based on assessment  - Modify environment to reduce risk of injury  - Provide assistive devices as appropriate  - Consider OT/PT consult to assist with strengthening/mobility  - Encourage toileting schedule  Outcome: Progressing

## 2022-02-18 ENCOUNTER — APPOINTMENT (OUTPATIENT)
Dept: GENERAL RADIOLOGY | Facility: HOSPITAL | Age: 72
DRG: 190 | End: 2022-02-18
Attending: INTERNAL MEDICINE
Payer: MEDICARE

## 2022-02-18 LAB
NT-PROBNP SERPL-MCNC: 2884 PG/ML (ref ?–125)
PROCALCITONIN SERPL-MCNC: <0.05 NG/ML (ref ?–0.16)

## 2022-02-18 PROCEDURE — 99232 SBSQ HOSP IP/OBS MODERATE 35: CPT | Performed by: INTERNAL MEDICINE

## 2022-02-18 PROCEDURE — 71045 X-RAY EXAM CHEST 1 VIEW: CPT | Performed by: INTERNAL MEDICINE

## 2022-02-18 RX ORDER — FUROSEMIDE 10 MG/ML
20 INJECTION INTRAMUSCULAR; INTRAVENOUS
Status: COMPLETED | OUTPATIENT
Start: 2022-02-18 | End: 2022-02-21

## 2022-02-18 NOTE — PLAN OF CARE
Problem: Patient/Family Goals  Goal: Patient/Family Long Term Goal  Description: Patient's Long Term Goal:   \"I CAME IN FOR TREATMENT SO I CAN G HOME HEALTHY AGAIN\"    Interventions:  IV STEROID, IV ANTIBX  CARRY OUT PLAN OF CARE  - See additional Care Plan goals for specific interventions  Outcome: Progressing  Goal: Patient/Family Short Term Goal  Description: Patient's Short Term Goal:   02/12/2022 - KEEP SATS ABOVE 90%  02/13/2022 - KEEP SATS ABOVE 90%, WEAN O2 as ABLE  2/14: wean o2 as tolerated   02/14/2022- WEAN O2 as ABLE  2/15 Am: wean O2 as tolerated and  the room   2/15: wean 02 needs  2/17 NOC: wean O2    Interventions:   - SUPPLEMENTAL O2 NEEDED  - See additional Care Plan goals for specific interventions  Outcome: Progressing     Problem: RESPIRATORY - ADULT  Goal: Achieves optimal ventilation and oxygenation  Description: INTERVENTIONS:  - Assess for changes in respiratory status  - Assess for changes in mentation and behavior  - Position to facilitate oxygenation and minimize respiratory effort  - Oxygen supplementation based on oxygen saturation or ABGs  - Provide Smoking Cessation handout, if applicable  - Encourage broncho-pulmonary hygiene including cough, deep breathe, Incentive Spirometry  - Assess the need for suctioning and perform as needed  - Assess and instruct to report SOB or any respiratory difficulty  - Respiratory Therapy support as indicated  - Manage/alleviate anxiety  - Monitor for signs/symptoms of CO2 retention  Outcome: Progressing     Problem: RISK FOR INFECTION - ADULT  Goal: Absence of fever/infection during anticipated neutropenic period  Description: INTERVENTIONS  - Monitor WBC  - Administer growth factors as ordered  - Implement neutropenic guidelines  Outcome: Progressing     Problem: SAFETY ADULT - FALL  Goal: Free from fall injury  Description: INTERVENTIONS:  - Assess pt frequently for physical needs  - Identify cognitive and physical deficits and behaviors that affect risk of falls.   - [place] fall precautions as indicated by assessment.  - Educate pt/family on patient safety including physical limitations  - Instruct pt to call for assistance with activity based on assessment  - Modify environment to reduce risk of injury  - Provide assistive devices as appropriate  - Consider OT/PT consult to assist with strengthening/mobility  - Encourage toileting schedule  Outcome: Progressing     Problem: DISCHARGE PLANNING  Goal: Discharge to home or other facility with appropriate resources  Description: INTERVENTIONS:  - Identify barriers to discharge w/pt and caregiver  - Include patient/family/discharge partner in discharge planning  - Arrange for needed discharge resources and transportation as appropriate  - Identify discharge learning needs (meds, wound care, etc)  - Arrange for interpreters to assist at discharge as needed  - Consider post-discharge preferences of patient/family/discharge partner  - Complete POLST form as appropriate  - Assess patient's ability to be responsible for managing their own health  - Refer to Case Management Department for coordinating discharge planning if the patient needs post-hospital services based on physician/LIP order or complex needs related to functional status, cognitive ability or social support system  Outcome: Progressing

## 2022-02-18 NOTE — PLAN OF CARE
Pt Aox4. Glasses at bedside. Chronic lesion on R nostril. VSS, afebrile. Maintaining o2 sats >90% on 6L at rest, 15L with exertion. Tele, NSR. Lovenox. Urinal at bedside, no episodes of diarrhea. No c/o N/V. PRN pain meds given for generalized pain. Up S/b for O2 needs-PT eval ordered. Regular diet tolerated. IV abx discontinued- WNL prolactin. L foot mepilex, c/d/I. Pt updated on POC. Will continue to wean O2 as tolerated; Pulm increased RT tx to Q4H, chest physiotherapy; Acapella, lasix BID added. Call light within reach. No further pt needs at this time. Will continue to round on pt.     IV STEROID, IV ANTIBX  CARRY OUT PLAN OF CARE  - See additional Care Plan goals for specific interventions  Outcome: Progressing  Goal: Patient/Family Short Term Goal  Description: Patient's Short Term Goal:   02/12/2022 - KEEP SATS ABOVE 90%  02/13/2022 - KEEP SATS ABOVE 90%, WEAN O2 as ABLE  2/14: wean o2 as tolerated   02/14/2022- WEAN O2 as ABLE  2/15 Am: wean O2 as tolerated and  the room   2/15: wean 02 needs  2/17 NOC: wean O2    Interventions:   - SUPPLEMENTAL O2 NEEDED  - See additional Care Plan goals for specific interventions  Outcome: Progressing     Problem: RESPIRATORY - ADULT  Goal: Achieves optimal ventilation and oxygenation  Description: INTERVENTIONS:  - Assess for changes in respiratory status  - Assess for changes in mentation and behavior  - Position to facilitate oxygenation and minimize respiratory effort  - Oxygen supplementation based on oxygen saturation or ABGs  - Provide Smoking Cessation handout, if applicable  - Encourage broncho-pulmonary hygiene including cough, deep breathe, Incentive Spirometry  - Assess the need for suctioning and perform as needed  - Assess and instruct to report SOB or any respiratory difficulty  - Respiratory Therapy support as indicated  - Manage/alleviate anxiety  - Monitor for signs/symptoms of CO2 retention  Outcome: Progressing     Problem: RISK FOR INFECTION - ADULT  Goal: Absence of fever/infection during anticipated neutropenic period  Description: INTERVENTIONS  - Monitor WBC  - Administer growth factors as ordered  - Implement neutropenic guidelines  Outcome: Progressing     Problem: SAFETY ADULT - FALL  Goal: Free from fall injury  Description: INTERVENTIONS:  - Assess pt frequently for physical needs  - Identify cognitive and physical deficits and behaviors that affect risk of falls.   - [place] fall precautions as indicated by assessment.  - Educate pt/family on patient safety including physical limitations  - Instruct pt to call for assistance with activity based on assessment  - Modify environment to reduce risk of injury  - Provide assistive devices as appropriate  - Consider OT/PT consult to assist with strengthening/mobility  - Encourage toileting schedule  Outcome: Progressing     Problem: DISCHARGE PLANNING  Goal: Discharge to home or other facility with appropriate resources  Description: INTERVENTIONS:  - Identify barriers to discharge w/pt and caregiver  - Include patient/family/discharge partner in discharge planning  - Arrange for needed discharge resources and transportation as appropriate  - Identify discharge learning needs (meds, wound care, etc)  - Arrange for interpreters to assist at discharge as needed  - Consider post-discharge preferences of patient/family/discharge partner  - Complete POLST form as appropriate  - Assess patient's ability to be responsible for managing their own health  - Refer to Case Management Department for coordinating discharge planning if the patient needs post-hospital services based on physician/LIP order or complex needs related to functional status, cognitive ability or social support system  Outcome: Progressing

## 2022-02-19 LAB
ANION GAP SERPL CALC-SCNC: 8 MMOL/L (ref 0–18)
BUN BLD-MCNC: 24 MG/DL (ref 7–18)
CALCIUM BLD-MCNC: 8.5 MG/DL (ref 8.5–10.1)
CHLORIDE SERPL-SCNC: 98 MMOL/L (ref 98–112)
CO2 SERPL-SCNC: 29 MMOL/L (ref 21–32)
CREAT BLD-MCNC: 0.74 MG/DL
GLUCOSE BLD-MCNC: 106 MG/DL (ref 70–99)
OSMOLALITY SERPL CALC.SUM OF ELEC: 284 MOSM/KG (ref 275–295)
POTASSIUM SERPL-SCNC: 4.7 MMOL/L (ref 3.5–5.1)
SODIUM SERPL-SCNC: 135 MMOL/L (ref 136–145)

## 2022-02-19 PROCEDURE — 99232 SBSQ HOSP IP/OBS MODERATE 35: CPT | Performed by: INTERNAL MEDICINE

## 2022-02-19 RX ORDER — IPRATROPIUM BROMIDE AND ALBUTEROL SULFATE 2.5; .5 MG/3ML; MG/3ML
3 SOLUTION RESPIRATORY (INHALATION)
Status: DISCONTINUED | OUTPATIENT
Start: 2022-02-19 | End: 2022-02-22

## 2022-02-19 RX ORDER — SODIUM CHLORIDE 30 MG/ML INHALATION SOLUTION 30 MG/ML
3 SOLUTION INHALANT
Status: DISCONTINUED | OUTPATIENT
Start: 2022-02-19 | End: 2022-02-22

## 2022-02-19 NOTE — PLAN OF CARE
PATIENT A&OX4. SATURATING IN THE LOW 90'S ON O2 AT 7/HFNC AT REST. HE DESATURATED TO 88% ON 15L/HFNC WITH ACTIVITY (WALKING TO THE BATHROOM AND BACK TO BED). NO CHEST PAIN. REPORTS PRODUCTIVE COUGH. NO FEVER. DENIES NAUSEA, VOMITING, DIARRHEA. VOIDING WELL. TYLENOL WAS GIVEN FOR C/O GENERALIZED ARTHRITIS PAIN. RIGHT ANKLE [address] IN PLACE. LESION TO RIGHT NOSTRIL DRY, NO DRAINAGE - OPEN TO AIR. Mobility Goal: up to emma with meals  Readmission Risk:     [] Low     [x] Medium     [] High    Active issue(s) requiring resolution prior to discharge:  High O2 needs    Anticipated discharge date: TBD    Current discharge plan: TBD    F/U appointment scheduled within 7 days. .. [] [facility] ([facility]     [] Pulmonologist     [] Primary Care Physician     [] Other Specialty    Watched the home discharge recovery videos related to diagnosis. .. [] Pneumonia     [x] COPD    [] Home Health set up. [x] Care partner identified and updated with the plan of care.       Problem: Patient/Family Goals  Goal: Patient/Family Long Term Goal  Description: Patient's Long Term Goal:   \"I CAME IN FOR TREATMENT SO I CAN G HOME HEALTHY AGAIN\"    Interventions:  IV STEROID, IV ANTIBX  CARRY OUT PLAN OF CARE  - See additional Care Plan goals for specific interventions  Outcome: Progressing  Goal: Patient/Family Short Term Goal  Description: Patient's Short Term Goal:   02/12/2022 - KEEP SATS ABOVE 90%  02/13/2022 - KEEP SATS ABOVE 90%, WEAN O2 as ABLE  2/14: wean o2 as tolerated   02/14/2022- WEAN O2 as ABLE  2/15 Am: wean O2 as tolerated and  the room   2/15: wean 02 needs  2/17 NOC: wean O2  02/18/222 - wean O2 as able    Interventions:   - SUPPLEMENTAL O2 NEEDED  - See additional Care Plan goals for specific interventions  Outcome: Progressing     Problem: RESPIRATORY - ADULT  Goal: Achieves optimal ventilation and oxygenation  Description: INTERVENTIONS:  - Assess for changes in respiratory status  - Assess for changes in mentation and behavior  - Position to facilitate oxygenation and minimize respiratory effort  - Oxygen supplementation based on oxygen saturation or ABGs  - Provide Smoking Cessation handout, if applicable  - Encourage broncho-pulmonary hygiene including cough, deep breathe, Incentive Spirometry  - Assess the need for suctioning and perform as needed  - Assess and instruct to report SOB or any respiratory difficulty  - Respiratory Therapy support as indicated  - Manage/alleviate anxiety  - Monitor for signs/symptoms of CO2 retention  Outcome: Progressing     Problem: RISK FOR INFECTION - ADULT  Goal: Absence of fever/infection during anticipated neutropenic period  Description: INTERVENTIONS  - Monitor WBC  - Administer growth factors as ordered  - Implement neutropenic guidelines  Outcome: Progressing     Problem: SAFETY ADULT - FALL  Goal: Free from fall injury  Description: INTERVENTIONS:  - Assess pt frequently for physical needs  - Identify cognitive and physical deficits and behaviors that affect risk of falls.   - [place] fall precautions as indicated by assessment.  - Educate pt/family on patient safety including physical limitations  - Instruct pt to call for assistance with activity based on assessment  - Modify environment to reduce risk of injury  - Provide assistive devices as appropriate  - Consider OT/PT consult to assist with strengthening/mobility  - Encourage toileting schedule  Outcome: Progressing     Problem: DISCHARGE PLANNING  Goal: Discharge to home or other facility with appropriate resources  Description: INTERVENTIONS:  - Identify barriers to discharge w/pt and caregiver  - Include patient/family/discharge partner in discharge planning  - Arrange for needed discharge resources and transportation as appropriate  - Identify discharge learning needs (meds, wound care, etc)  - Arrange for interpreters to assist at discharge as needed  - Consider post-discharge preferences of patient/family/discharge partner  - Complete POLST form as appropriate  - Assess patient's ability to be responsible for managing their own health  - Refer to Case Management Department for coordinating discharge planning if the patient needs post-hospital services based on physician/LIP order or complex needs related to functional status, cognitive ability or social support system  Outcome: Progressing

## 2022-02-20 LAB
ANION GAP SERPL CALC-SCNC: 7 MMOL/L (ref 0–18)
BUN BLD-MCNC: 26 MG/DL (ref 7–18)
CALCIUM BLD-MCNC: 8.2 MG/DL (ref 8.5–10.1)
CHLORIDE SERPL-SCNC: 99 MMOL/L (ref 98–112)
CO2 SERPL-SCNC: 30 MMOL/L (ref 21–32)
CREAT BLD-MCNC: 0.73 MG/DL
GLUCOSE BLD-MCNC: 111 MG/DL (ref 70–99)
POTASSIUM SERPL-SCNC: 4.7 MMOL/L (ref 3.5–5.1)
SODIUM SERPL-SCNC: 136 MMOL/L (ref 136–145)

## 2022-02-20 PROCEDURE — 99232 SBSQ HOSP IP/OBS MODERATE 35: CPT | Performed by: INTERNAL MEDICINE

## 2022-02-20 NOTE — PLAN OF CARE
WEANED TO 5L/HFNC AT REST OVERNIGHT. REMINDED TO USE FLUTTER VALVE AND I.S. \"I USE IT EVERY HOUR LIKE IM SUPPOSED TO. I'M A GOOD BOY,\". WONT PRONE BUT HE SLEPT ON HIS RIGHT SIDE MOST OF THE NIGHT. Mobility Goal: UP IN CHAIR FOR MEALS    Readmission Risk:     [] Low     [x] Medium     [] High    Active issue(s) requiring resolution prior to discharge:  HIGH O2 NEEDS ESPECIALLY WITH ACTIVITY    Anticipated discharge date: TBD    Current discharge plan: TBD    F/U appointment scheduled within 7 days. .. [] [facility] ([facility]     [] Pulmonologist     [] Primary Care Physician     [] Other Specialty    Watched the home discharge recovery videos related to diagnosis. .. [] Pneumonia     [] COPD    [] Home Health set up. [x] Care partner identified and updated with the plan of care.       Problem: Patient/Family Goals  Goal: Patient/Family Long Term Goal  Description: Patient's Long Term Goal:   \"I CAME IN FOR TREATMENT SO I CAN G HOME HEALTHY AGAIN\"    Interventions:  IV STEROID, IV ANTIBX  CARRY OUT PLAN OF CARE  - See additional Care Plan goals for specific interventions  Outcome: Progressing  Goal: Patient/Family Short Term Goal  Description: Patient's Short Term Goal:   02/12/2022 - KEEP SATS ABOVE 90%  02/13/2022 - KEEP SATS ABOVE 90%, WEAN O2 as ABLE  2/14: wean o2 as tolerated   02/14/2022- WEAN O2 as ABLE  2/15 Am: wean O2 as tolerated and  the room   2/15: wean 02 needs  2/17 NOC: wean O2  02/18/222 - wean O2 as able  02/19/2022 - wean O2    Interventions:   - SUPPLEMENTAL O2 NEEDED  - See additional Care Plan goals for specific interventions  Outcome: Progressing     Problem: RESPIRATORY - ADULT  Goal: Achieves optimal ventilation and oxygenation  Description: INTERVENTIONS:  - Assess for changes in respiratory status  - Assess for changes in mentation and behavior  - Position to facilitate oxygenation and minimize respiratory effort  - Oxygen supplementation based on oxygen saturation or ABGs  - Provide Smoking Cessation handout, if applicable  - Encourage broncho-pulmonary hygiene including cough, deep breathe, Incentive Spirometry  - Assess the need for suctioning and perform as needed  - Assess and instruct to report SOB or any respiratory difficulty  - Respiratory Therapy support as indicated  - Manage/alleviate anxiety  - Monitor for signs/symptoms of CO2 retention  Outcome: Progressing     Problem: RISK FOR INFECTION - ADULT  Goal: Absence of fever/infection during anticipated neutropenic period  Description: INTERVENTIONS  - Monitor WBC  - Administer growth factors as ordered  - Implement neutropenic guidelines  Outcome: Progressing     Problem: SAFETY ADULT - FALL  Goal: Free from fall injury  Description: INTERVENTIONS:  - Assess pt frequently for physical needs  - Identify cognitive and physical deficits and behaviors that affect risk of falls.   - [place] fall precautions as indicated by assessment.  - Educate pt/family on patient safety including physical limitations  - Instruct pt to call for assistance with activity based on assessment  - Modify environment to reduce risk of injury  - Provide assistive devices as appropriate  - Consider OT/PT consult to assist with strengthening/mobility  - Encourage toileting schedule  Outcome: Progressing     Problem: DISCHARGE PLANNING  Goal: Discharge to home or other facility with appropriate resources  Description: INTERVENTIONS:  - Identify barriers to discharge w/pt and caregiver  - Include patient/family/discharge partner in discharge planning  - Arrange for needed discharge resources and transportation as appropriate  - Identify discharge learning needs (meds, wound care, etc)  - Arrange for interpreters to assist at discharge as needed  - Consider post-discharge preferences of patient/family/discharge partner  - Complete POLST form as appropriate  - Assess patient's ability to be responsible for managing their own health  - Refer to Case Management Department for coordinating discharge planning if the patient needs post-hospital services based on physician/LIP order or complex needs related to functional status, cognitive ability or social support system  Outcome: Progressing

## 2022-02-20 NOTE — PROGRESS NOTES
Patient alert and oriented x4. VSS. Tele NSR. : weaned to 6L at rest and needs 12-15L with activity. Continues solu-medrol, CPT, hypertonic saline added, inhalers, and IV lasix x2 on shift. Continue to wean as tolerated. Baseline 3-4L at rest and 5L with activity. Plan to discharge home on larger home concentrator that can handle higher O2 needs. Occasional dry cough noted. Tessalon given on shift. Abdomen is soft and flat with active bowels x4. Cont x2. Uses urinal. Clear/yellow urine with large output all shift in urinal following lasix. Up self in room. Shower on shift. Dressing change done to left outer ankle. Patient reports pain to bilat legs in AM and given prn tylenol with good affect. Reports pain is from arthritis. Lesion noted to right side of nose and patient is aware he should see derm outpatient to have biopsy done. Patient and family updated on plan of care and patient status and all questions answered.

## 2022-02-21 LAB
ANION GAP SERPL CALC-SCNC: 4 MMOL/L (ref 0–18)
BUN BLD-MCNC: 26 MG/DL (ref 7–18)
CALCIUM BLD-MCNC: 8.2 MG/DL (ref 8.5–10.1)
CHLORIDE SERPL-SCNC: 100 MMOL/L (ref 98–112)
CO2 SERPL-SCNC: 32 MMOL/L (ref 21–32)
CREAT BLD-MCNC: 0.71 MG/DL
GLUCOSE BLD-MCNC: 113 MG/DL (ref 70–99)
OSMOLALITY SERPL CALC.SUM OF ELEC: 288 MOSM/KG (ref 275–295)
POTASSIUM SERPL-SCNC: 5 MMOL/L (ref 3.5–5.1)
SODIUM SERPL-SCNC: 136 MMOL/L (ref 136–145)

## 2022-02-21 PROCEDURE — 99232 SBSQ HOSP IP/OBS MODERATE 35: CPT | Performed by: INTERNAL MEDICINE

## 2022-02-21 RX ORDER — FUROSEMIDE 10 MG/ML
20 INJECTION INTRAMUSCULAR; INTRAVENOUS
Status: COMPLETED | OUTPATIENT
Start: 2022-02-21 | End: 2022-02-22

## 2022-02-21 RX ORDER — SODIUM CHLORIDE/ALOE VERA
1 GEL (GRAM) NASAL AS NEEDED
Qty: 1 EACH | Refills: 0 | Status: SHIPPED | OUTPATIENT
Start: 2022-02-21

## 2022-02-21 RX ORDER — BENZONATATE 100 MG/1
100 CAPSULE ORAL 3 TIMES DAILY
Qty: 30 CAPSULE | Refills: 0 | Status: SHIPPED | OUTPATIENT
Start: 2022-02-21

## 2022-02-21 RX ORDER — ECHINACEA PURPUREA EXTRACT 125 MG
1 TABLET ORAL
Qty: 15 ML | Refills: 0 | Status: SHIPPED | OUTPATIENT
Start: 2022-02-21

## 2022-02-21 RX ORDER — CODEINE PHOSPHATE AND GUAIFENESIN 10; 100 MG/5ML; MG/5ML
5 SOLUTION ORAL EVERY 4 HOURS PRN
Qty: 118 ML | Refills: 0 | Status: SHIPPED | OUTPATIENT
Start: 2022-02-21

## 2022-02-21 NOTE — PLAN OF CARE
Problem: Acute on chronic respiratory failure with hypoxia    Assessment: A&Ox4. Vital signs stable, afebrile. Baseline 3-4L O2. Req 5-6L O2 at rest and 10L with ambulation to maintain sats >90%. Low activity tolerance, dyspneic on exertion and with freq coughing. NSR on tele. No c/o pain, n/v/d, dizziness. Good appetite. Voids. Up SBA. Ambulated in the hallways x2. Has generalized weakness. Wounds noted in left ankle. Red lesion on nose. Intervention: IV nebs, steroids, antitussives. IS hourly. Frequent ambulation. Fall precautions. Wound care done. Education:  Safety. Plan of care. Medication. Ambulation. Oxygen therapy. Response: Patient verbalized understanding. Multidisciplinary Discharge Rounds held 2/21/2022. Treatment team members present today include , , Charge Nurse, Nurse, RT, PT and Pharmacy caring for Davian Arvizu. Other care providers present:    Mobility Goal: [place]    Readmission Risk:     [] Low     [] Medium     [x] High    Active issue(s) requiring resolution prior to discharge: High O2 needs    Anticipated discharge date: Tomorrow    Current discharge plan: Home with home O2    F/U appointment scheduled within 7 days. .. [] [facility] ([facility]     [x] Pulmonologist     [x] Primary Care Physician     [] Other Specialty    Watched the home discharge recovery videos related to diagnosis. .. [x] Pneumonia     [] COPD    [] Home Health set up. [x] Care partner identified and updated with the plan of care.       Problem: Patient/Family Goals  Goal: Patient/Family Long Term Goal  Description: Patient's Long Term Goal:   \"I CAME IN FOR TREATMENT SO I CAN G HOME HEALTHY AGAIN\"    Interventions:  IV STEROID, IV ANTIBX  CARRY OUT PLAN OF CARE  - See additional Care Plan goals for specific interventions  Outcome: Progressing  Goal: Patient/Family Short Term Goal  Description: Patient's Short Term Goal:   02/12/2022 - KEEP SATS ABOVE 90%  02/13/2022 - KEEP SATS ABOVE 90%, WEAN O2 as ABLE  2/14: wean o2 as tolerated   02/14/2022- WEAN O2 as ABLE  2/15 Am: wean O2 as tolerated and  the room   2/15: wean 02 needs  2/17 NOC: wean O2  02/18/222 - wean O2 as able  02/19/2022 - wean O2  02/20/2022 wean o2  2/21 AM: wean O2, possibly go home today    Interventions:   - SUPPLEMENTAL O2 NEEDED  - See additional Care Plan goals for specific interventions  Outcome: Progressing     Problem: RESPIRATORY - ADULT  Goal: Achieves optimal ventilation and oxygenation  Description: INTERVENTIONS:  - Assess for changes in respiratory status  - Assess for changes in mentation and behavior  - Position to facilitate oxygenation and minimize respiratory effort  - Oxygen supplementation based on oxygen saturation or ABGs  - Provide Smoking Cessation handout, if applicable  - Encourage broncho-pulmonary hygiene including cough, deep breathe, Incentive Spirometry  - Assess the need for suctioning and perform as needed  - Assess and instruct to report SOB or any respiratory difficulty  - Respiratory Therapy support as indicated  - Manage/alleviate anxiety  - Monitor for signs/symptoms of CO2 retention  Outcome: Progressing     Problem: RISK FOR INFECTION - ADULT  Goal: Absence of fever/infection during anticipated neutropenic period  Description: INTERVENTIONS  - Monitor WBC  - Administer growth factors as ordered  - Implement neutropenic guidelines  Outcome: Progressing     Problem: SAFETY ADULT - FALL  Goal: Free from fall injury  Description: INTERVENTIONS:  - Assess pt frequently for physical needs  - Identify cognitive and physical deficits and behaviors that affect risk of falls.   - [place] fall precautions as indicated by assessment.  - Educate pt/family on patient safety including physical limitations  - Instruct pt to call for assistance with activity based on assessment  - Modify environment to reduce risk of injury  - Provide assistive devices as appropriate  - Consider OT/PT consult to assist with strengthening/mobility  - Encourage toileting schedule  Outcome: Progressing     Problem: DISCHARGE PLANNING  Goal: Discharge to home or other facility with appropriate resources  Description: INTERVENTIONS:  - Identify barriers to discharge w/pt and caregiver  - Include patient/family/discharge partner in discharge planning  - Arrange for needed discharge resources and transportation as appropriate  - Identify discharge learning needs (meds, wound care, etc)  - Arrange for interpreters to assist at discharge as needed  - Consider post-discharge preferences of patient/family/discharge partner  - Complete POLST form as appropriate  - Assess patient's ability to be responsible for managing their own health  - Refer to Case Management Department for coordinating discharge planning if the patient needs post-hospital services based on physician/LIP order or complex needs related to functional status, cognitive ability or social support system  Outcome: Progressing

## 2022-02-21 NOTE — PROGRESS NOTES
Patient alert and oriented x4. VSS. Tele NSR. : weaned to 4L at rest and needs 8-10L with activity. Continues solu-medrol, CPT, hypertonic saline added, inhalers, and IV lasix x2 on shift. Continue to wean as tolerated. Baseline 3-4L at rest and 5L with activity. Plan to discharge home on larger home concentrator that can handle higher O2 needs. Occasional dry cough noted. Tessalon given on shift. Abdomen is soft and flat with active bowels x4. Cont x2. Uses urinal. Clear/yellow urine with large output all shift in urinal following lasix. Up self in room. Patient reports pain to right knee in AM and given prn tylenol with good affect. Reports pain is from arthritis. Lesion noted to right side of nose and patient is aware he should see derm outpatient to have biopsy done. Patient and family updated on plan of care and patient status and all questions answered.

## 2022-02-21 NOTE — CM/SW NOTE
Dustin sent order for 10 liter concentrator to [facility]. Doreen Montalvo from [facility] will deliver tonight- need concentrator at home prior to pt's dc. Dustin updated pt- he is worried that he needs 10 liters with activity and wants to wait until tomorrow to dc. Dustin sent message to Dr. Kd Rendon and Buell Crigler re: above. Casey Pereyra LCSW  /Discharge Planner  [phone]    Offered HHC to pt and he declined.     Plan to dc in am per Dr. Kd Rendon

## 2022-02-21 NOTE — PLAN OF CARE
Now weaned to 3L at rest/sleeping on his right side. IV solumedrol as ordered. Was given Tylenol as ordered fo c/o generalized arthritis pain \"and it helps me sleep\". Afebrile    Mobility Goal: up in chair for meals    Readmission Risk:     [] Low     [x] Medium     [] High    Active issue(s) requiring resolution prior to discharge:  High o2 needs with activity    Anticipated discharge date: TBD  Current discharge plan: TBD    F/U appointment scheduled within 7 days. .. [] [facility] ([facility])     [] Pulmonologist     [] Primary Care Physician     [] Other Specialty    Watched the home discharge recovery videos related to diagnosis. .. [] Pneumonia     [] COPD    [] Home Health set up. [x] Care partner identified and updated with the plan of care.         Problem: Patient/Family Goals  Goal: Patient/Family Long Term Goal  Description: Patient's Long Term Goal:   \"I CAME IN FOR TREATMENT SO I CAN G HOME HEALTHY AGAIN\"    Interventions:  IV STEROID, IV ANTIBX  CARRY OUT PLAN OF CARE  - See additional Care Plan goals for specific interventions  Outcome: Progressing  Goal: Patient/Family Short Term Goal  Description: Patient's Short Term Goal:   02/12/2022 - KEEP SATS ABOVE 90%  02/13/2022 - KEEP SATS ABOVE 90%, WEAN O2 as ABLE  2/14: wean o2 as tolerated   02/14/2022- WEAN O2 as ABLE  2/15 Am: wean O2 as tolerated and  the room   2/15: wean 02 needs  2/17 NOC: wean O2  02/18/222 - wean O2 as able  02/19/2022 - wean O2  02/20/2022 wean o2  Interventions:   - SUPPLEMENTAL O2 NEEDED  - See additional Care Plan goals for specific interventions  Outcome: Progressing     Problem: RESPIRATORY - ADULT  Goal: Achieves optimal ventilation and oxygenation  Description: INTERVENTIONS:  - Assess for changes in respiratory status  - Assess for changes in mentation and behavior  - Position to facilitate oxygenation and minimize respiratory effort  - Oxygen supplementation based on oxygen saturation or ABGs  - Provide Smoking Cessation handout, if applicable  - Encourage broncho-pulmonary hygiene including cough, deep breathe, Incentive Spirometry  - Assess the need for suctioning and perform as needed  - Assess and instruct to report SOB or any respiratory difficulty  - Respiratory Therapy support as indicated  - Manage/alleviate anxiety  - Monitor for signs/symptoms of CO2 retention  Outcome: Progressing     Problem: RISK FOR INFECTION - ADULT  Goal: Absence of fever/infection during anticipated neutropenic period  Description: INTERVENTIONS  - Monitor WBC  - Administer growth factors as ordered  - Implement neutropenic guidelines  Outcome: Progressing     Problem: SAFETY ADULT - FALL  Goal: Free from fall injury  Description: INTERVENTIONS:  - Assess pt frequently for physical needs  - Identify cognitive and physical deficits and behaviors that affect risk of falls.   - [place] fall precautions as indicated by assessment.  - Educate pt/family on patient safety including physical limitations  - Instruct pt to call for assistance with activity based on assessment  - Modify environment to reduce risk of injury  - Provide assistive devices as appropriate  - Consider OT/PT consult to assist with strengthening/mobility  - Encourage toileting schedule  Outcome: Progressing     Problem: DISCHARGE PLANNING  Goal: Discharge to home or other facility with appropriate resources  Description: INTERVENTIONS:  - Identify barriers to discharge w/pt and caregiver  - Include patient/family/discharge partner in discharge planning  - Arrange for needed discharge resources and transportation as appropriate  - Identify discharge learning needs (meds, wound care, etc)  - Arrange for interpreters to assist at discharge as needed  - Consider post-discharge preferences of patient/family/discharge partner  - Complete POLST form as appropriate  - Assess patient's ability to be responsible for managing their own health  - Refer to Case Management Department for coordinating discharge planning if the patient needs post-hospital services based on physician/LIP order or complex needs related to functional status, cognitive ability or social support system  Outcome: Progressing

## 2022-02-21 NOTE — PROGRESS NOTES
02/21/22 1500   Mobility   O2 walk? Yes   SPO2% on Room Air at Rest 80   SPO2% on Oxygen at Rest 92   At rest oxygen flow (liters per minute) 6   SPO2% Ambulation on Room Air 76   SPO2% Ambulation on Oxygen 91   Ambulation oxygen flow (liters per minute) 10       Patient's spO2 is 85% at rest and 82% with ambulation on 4L.

## 2022-02-22 VITALS
WEIGHT: 196 LBS | TEMPERATURE: 98 F | BODY MASS INDEX: 27 KG/M2 | HEART RATE: 71 BPM | SYSTOLIC BLOOD PRESSURE: 145 MMHG | RESPIRATION RATE: 18 BRPM | OXYGEN SATURATION: 93 % | DIASTOLIC BLOOD PRESSURE: 76 MMHG

## 2022-02-22 PROCEDURE — 99239 HOSP IP/OBS DSCHRG MGMT >30: CPT | Performed by: INTERNAL MEDICINE

## 2022-02-22 RX ORDER — IPRATROPIUM BROMIDE AND ALBUTEROL SULFATE 2.5; .5 MG/3ML; MG/3ML
3 SOLUTION RESPIRATORY (INHALATION) EVERY 4 HOURS PRN
Status: DISCONTINUED | OUTPATIENT
Start: 2022-02-22 | End: 2022-02-22

## 2022-02-22 RX ORDER — IPRATROPIUM BROMIDE AND ALBUTEROL SULFATE 2.5; .5 MG/3ML; MG/3ML
3 SOLUTION RESPIRATORY (INHALATION)
Status: DISCONTINUED | OUTPATIENT
Start: 2022-02-22 | End: 2022-02-22

## 2022-02-22 RX ORDER — PREDNISONE 10 MG/1
TABLET ORAL
Qty: 63 TABLET | Refills: 0 | Status: SHIPPED | OUTPATIENT
Start: 2022-02-22 | End: 2022-03-12

## 2022-02-22 RX ORDER — PROMETHAZINE HYDROCHLORIDE 25 MG/1
3 TABLET ORAL EVERY 6 HOURS PRN
Qty: 1000 ML | Refills: 2 | Status: SHIPPED | OUTPATIENT
Start: 2022-02-22

## 2022-02-22 NOTE — PHYSICAL THERAPY NOTE
PHYSICAL THERAPY TREATMENT NOTE - INPATIENT    Room Number: 335/298-W     Session: 1     Number of Visits to Meet Established Goals: 2    Presenting Problem: SOB  Co-Morbidities : COPD HTN  ASSESSMENT     Pt continues to progress toward functional goals and is  motivated to participate in skilled PT. The [facility] '6-Clicks' Inpatient Basic Mobility Short Form was completed and this patient is demonstrating a 21% degree of impairment in mobility. Research supports that patients with this level of impairment may benefit from home at d/c .      DISCHARGE RECOMMENDATIONS  PT Discharge Recommendations: Home     PLAN  PT Treatment Plan: Endurance; Energy conservation;Patient education;Gait training;Balance training  Rehab Potential : Good  Frequency (Obs):  (1-2 additional sessions)    CURRENT GOALS       Goal #1 Patient is able to demonstrate supine - sit EOB @ level: independent      Goal #2 Patient is able to demonstrate transfers Sit to/from Stand at assistance level: independent      Goal #3 Patient is able to ambulate 200 feet with assist device: none at assistance level: supervision with supplemental O2 saturations > 90%      Goal #4     Goal #5     Goal #6     Goal Comments: Goals established on 2022 all goals ongoing      SUBJECTIVE  \" I do COPD exercises on you tube\"     OBJECTIVE       WEIGHT BEARING RESTRICTION  Weight Bearing Restriction: None                PAIN ASSESSMENT   Ratin  Location: denies       BALANCE                                                                                                                       Static Sitting: Good  Dynamic Sitting: Fair +           Static Standing: Fair  Dynamic Standing: Fair    ACTIVITY TOLERANCE                         O2 WALK         [facility] '6-Clicks' INPATIENT SHORT FORM - BASIC MOBILITY  How much difficulty does the patient currently have. ..   Patient Difficulty: Turning over in bed (including adjusting bedclothes, sheets and blankets)?: None   Patient Difficulty: Sitting down on and standing up from a chair with arms (e.g., wheelchair, bedside commode, etc.): None   Patient Difficulty: Moving from lying on back to sitting on the side of the bed?: None   How much help from another person does the patient currently need. .. Help from Another: Moving to and from a bed to a chair (including a wheelchair)?: None   Help from Another: Need to walk in hospital room?: A Little   Help from Another: Climbing 3-5 steps with a railing?: A Little       AM-PAC Score:  Raw Score: 22   Approx Degree of Impairment: 20.91%   Standardized Score (AM-PAC Scale): 53.28   CMS Modifier (G-Code): CJ    FUNCTIONAL ABILITY STATUS  Gait Assessment   Functional Mobility/Gait Assessment  Gait Assistance: Contact guard assist  Distance (ft): 250  Assistive Device: None  Pattern: Shuffle (NBOS)  Stairs: (NT)    Skilled Therapy Provided  Pt educated on role of PT and goals for session. Pt gait trained in [facility], CGA to supervision, pt with occasional sway , however, no LOB. 2 standing rest breaks. Pt on 10L HFNC sats 92%. Pt educated on seated HEP, pt reports he does seated exercises normally . Educated pt on EC. Pt verbalizes understanding.   MODIFIED VANE DYSPNEA SCALE - (SHORTNESS OF BREATH SCALE)    SCORE DESCRIPTION   0 Nothing at all   1 Very slight   2 Slight   3 Moderate   4 Somewhat severe   5 Strong or hard breathing   6    7 Very hard breathing   8    9 Very, Very Severe   10 MAX - You need to stop     Patient Education provided:    Use this scale to rate the difficulty of your breathing:  - As you would rate your pain from 0-10, you can rate your SOB from 0-10  - Goal is to stay below 7/10 with activity  - If you reach beyond 7/10, it is important to rest; stop activity and if you need to sit down to recover  Pt reports 5/10 RPE     Bed Mobility:  Rolling right: NT   Rolling left: NT    Supine<>Sit: NT   Sit<>Supine: NT      Transfer Mobility:  Sit<>Stand: supervision    Stand<>Sit: supervision    Gait: CGA           Patient End of Session: Up in chair;Needs met;Call light within reach;RN aware of session/findings; All patient questions and concerns addressed    Therapist PPE: Mask, gloves and goggles were worn.   Patient/Family PPE: Mask in [place]

## 2022-02-22 NOTE — CM/SW NOTE
Per RN, jose [employer] [place] concentrator was delivered to pt's home yesterday. Pt will dc home today.

## 2022-02-22 NOTE — PLAN OF CARE
A&OX4.  CURRENTLY REQUIRING  SUPPLEMENTAL O2 AT 6L/HFNC TO KEEP HIS SATS ABOVE 89% AT REST. C/O GENERALIZED ARTHRITIS PAIN - MANAGED WITH TYLENOL. VOIDING GOOD AMOUNTS PER URINAL.  VSS. ANTICIPATING DISCHARGE TODAY. Mobility Goal:  SIT UP IN CHAIR FOR MEALS    Readmission Risk:     [] Low     [] Medium     [x] High    Active issue(s) requiring resolution prior to discharge:   HIGH O2 NEEDS WITH ACTIVITY    Anticipated discharge date: TODAY    Current discharge plan: HOME    F/U appointment scheduled within 7 days. .. [] [facility] ([facility])     [x] Pulmonologist     [x] Primary Care Physician     [] Other Specialty    Watched the home discharge recovery videos related to diagnosis. .. [x] Pneumonia     [x] COPD    [] Home Health set up. [x] Care partner identified and updated with the plan of care.       Problem: Patient/Family Goals  Goal: Patient/Family Long Term Goal  Description: Patient's Long Term Goal:   \"I CAME IN FOR TREATMENT SO I CAN G HOME HEALTHY AGAIN\"    Interventions:  IV STEROID, IV ANTIBX  CARRY OUT PLAN OF CARE  - See additional Care Plan goals for specific interventions  Outcome: Progressing  Goal: Patient/Family Short Term Goal  Description: Patient's Short Term Goal:   02/12/2022 - KEEP SATS ABOVE 90%  02/13/2022 - KEEP SATS ABOVE 90%, WEAN O2 as ABLE  2/14: wean o2 as tolerated   02/14/2022- WEAN O2 as ABLE  2/15 Am: wean O2 as tolerated and  the room   2/15: wean 02 needs  2/17 NOC: wean O2  02/18/222 - wean O2 as able  02/19/2022 - wean O2  02/20/2022 wean o2  2/21 AM: wean O2, possibly go home today  02/21/2022 - WEAN O2 - HOME IN THE MORNING  Interventions:   - SUPPLEMENTAL O2 NEEDED  - See additional Care Plan goals for specific interventions  Outcome: Progressing     Problem: RESPIRATORY - ADULT  Goal: Achieves optimal ventilation and oxygenation  Description: INTERVENTIONS:  - Assess for changes in respiratory status  - Assess for changes in mentation and behavior  - Position to facilitate oxygenation and minimize respiratory effort  - Oxygen supplementation based on oxygen saturation or ABGs  - Provide Smoking Cessation handout, if applicable  - Encourage broncho-pulmonary hygiene including cough, deep breathe, Incentive Spirometry  - Assess the need for suctioning and perform as needed  - Assess and instruct to report SOB or any respiratory difficulty  - Respiratory Therapy support as indicated  - Manage/alleviate anxiety  - Monitor for signs/symptoms of CO2 retention  Outcome: Progressing     Problem: RISK FOR INFECTION - ADULT  Goal: Absence of fever/infection during anticipated neutropenic period  Description: INTERVENTIONS  - Monitor WBC  - Administer growth factors as ordered  - Implement neutropenic guidelines  Outcome: Progressing     Problem: SAFETY ADULT - FALL  Goal: Free from fall injury  Description: INTERVENTIONS:  - Assess pt frequently for physical needs  - Identify cognitive and physical deficits and behaviors that affect risk of falls.   - [place] fall precautions as indicated by assessment.  - Educate pt/family on patient safety including physical limitations  - Instruct pt to call for assistance with activity based on assessment  - Modify environment to reduce risk of injury  - Provide assistive devices as appropriate  - Consider OT/PT consult to assist with strengthening/mobility  - Encourage toileting schedule  Outcome: Progressing     Problem: DISCHARGE PLANNING  Goal: Discharge to home or other facility with appropriate resources  Description: INTERVENTIONS:  - Identify barriers to discharge w/pt and caregiver  - Include patient/family/discharge partner in discharge planning  - Arrange for needed discharge resources and transportation as appropriate  - Identify discharge learning needs (meds, wound care, etc)  - Arrange for interpreters to assist at discharge as needed  - Consider post-discharge preferences of patient/family/discharge partner  - Complete POLST form as appropriate  - Assess patient's ability to be responsible for managing their own health  - Refer to Case Management Department for coordinating discharge planning if the patient needs post-hospital services based on physician/LIP order or complex needs related to functional status, cognitive ability or social support system  Outcome: Progressing

## 2022-02-22 NOTE — DISCHARGE PLANNING
NURSING DISCHARGE NOTE    Discharged Home via Wheelchair. Accompanied by Support staff  Belongings Taken by patient/family. Discharge navigator complete. Discharge instructions reviewed with patient at bedside. All questions & concerns addressed at this time.

## 2022-03-09 ENCOUNTER — ORDER TRANSCRIPTION (OUTPATIENT)
Dept: ADMINISTRATIVE | Facility: HOSPITAL | Age: 72
End: 2022-03-09

## 2022-03-21 ENCOUNTER — HOSPITAL ENCOUNTER (OUTPATIENT)
Dept: CV DIAGNOSTICS | Facility: HOSPITAL | Age: 72
Discharge: HOME | End: 2022-03-21
Attending: INTERNAL MEDICINE
Payer: MEDICARE

## 2022-03-21 DIAGNOSIS — R00.2 PALPITATION: ICD-10-CM

## 2022-03-21 DIAGNOSIS — I49.3 PVC'S (PREMATURE VENTRICULAR CONTRACTIONS): ICD-10-CM

## 2022-03-21 DIAGNOSIS — R06.00 DOE (DYSPNEA ON EXERTION): ICD-10-CM

## 2022-03-21 PROCEDURE — 93017 CV STRESS TEST TRACING ONLY: CPT | Performed by: INTERNAL MEDICINE

## 2022-03-21 PROCEDURE — 93350 STRESS TTE ONLY: CPT | Performed by: INTERNAL MEDICINE

## 2022-03-21 PROCEDURE — 93018 CV STRESS TEST I&R ONLY: CPT | Performed by: INTERNAL MEDICINE

## 2022-03-21 RX ORDER — DOBUTAMINE HYDROCHLORIDE 200 MG/100ML
INJECTION INTRAVENOUS
Status: COMPLETED
Start: 2022-03-21 | End: 2022-03-21

## 2022-03-21 RX ADMIN — DOBUTAMINE HYDROCHLORIDE 40 MCG/KG/MIN: 200 INJECTION INTRAVENOUS at 09:45:00

## 2025-06-14 NOTE — CM/SW NOTE
Pt admitted for pneumonia. Pt currently uses Home 02 through [address] [facility] - 3-4L 02 is his baseline. with patient

## (undated) DIAGNOSIS — R06.00 DOE (DYSPNEA ON EXERTION): Primary | ICD-10-CM

## (undated) DIAGNOSIS — R00.2 PALPITATION: ICD-10-CM

## (undated) DIAGNOSIS — I49.3 PVC'S (PREMATURE VENTRICULAR CONTRACTIONS): ICD-10-CM